# Patient Record
Sex: MALE | Race: WHITE | NOT HISPANIC OR LATINO
[De-identification: names, ages, dates, MRNs, and addresses within clinical notes are randomized per-mention and may not be internally consistent; named-entity substitution may affect disease eponyms.]

---

## 2017-12-12 ENCOUNTER — APPOINTMENT (OUTPATIENT)
Dept: UROLOGY | Facility: CLINIC | Age: 72
End: 2017-12-12
Payer: MEDICARE

## 2017-12-12 VITALS
TEMPERATURE: 98.1 F | SYSTOLIC BLOOD PRESSURE: 135 MMHG | HEIGHT: 74 IN | HEART RATE: 85 BPM | WEIGHT: 205 LBS | DIASTOLIC BLOOD PRESSURE: 75 MMHG | RESPIRATION RATE: 17 BRPM | BODY MASS INDEX: 26.31 KG/M2

## 2017-12-12 DIAGNOSIS — N48.1 BALANITIS: ICD-10-CM

## 2017-12-12 PROCEDURE — 99213 OFFICE O/P EST LOW 20 MIN: CPT

## 2018-10-02 ENCOUNTER — APPOINTMENT (OUTPATIENT)
Dept: UROLOGY | Facility: CLINIC | Age: 73
End: 2018-10-02
Payer: MEDICARE

## 2018-10-02 DIAGNOSIS — N41.1 CHRONIC PROSTATITIS: ICD-10-CM

## 2018-10-02 PROCEDURE — 99213 OFFICE O/P EST LOW 20 MIN: CPT

## 2018-12-11 ENCOUNTER — APPOINTMENT (OUTPATIENT)
Dept: UROLOGY | Facility: CLINIC | Age: 73
End: 2018-12-11
Payer: MEDICARE

## 2018-12-11 PROCEDURE — 99214 OFFICE O/P EST MOD 30 MIN: CPT

## 2019-01-19 ENCOUNTER — RX RENEWAL (OUTPATIENT)
Age: 74
End: 2019-01-19

## 2019-12-12 ENCOUNTER — FORM ENCOUNTER (OUTPATIENT)
Age: 74
End: 2019-12-12

## 2019-12-13 ENCOUNTER — OUTPATIENT (OUTPATIENT)
Dept: OUTPATIENT SERVICES | Facility: HOSPITAL | Age: 74
LOS: 1 days | End: 2019-12-13
Payer: MEDICARE

## 2019-12-13 ENCOUNTER — APPOINTMENT (OUTPATIENT)
Dept: UROLOGY | Facility: CLINIC | Age: 74
End: 2019-12-13
Payer: MEDICARE

## 2019-12-13 ENCOUNTER — APPOINTMENT (OUTPATIENT)
Dept: ULTRASOUND IMAGING | Facility: IMAGING CENTER | Age: 74
End: 2019-12-13
Payer: MEDICARE

## 2019-12-13 VITALS
WEIGHT: 195 LBS | TEMPERATURE: 98.4 F | BODY MASS INDEX: 25.03 KG/M2 | HEIGHT: 74 IN | HEART RATE: 78 BPM | DIASTOLIC BLOOD PRESSURE: 74 MMHG | RESPIRATION RATE: 17 BRPM | SYSTOLIC BLOOD PRESSURE: 134 MMHG

## 2019-12-13 DIAGNOSIS — N28.1 CYST OF KIDNEY, ACQUIRED: ICD-10-CM

## 2019-12-13 PROCEDURE — 76770 US EXAM ABDO BACK WALL COMP: CPT

## 2019-12-13 PROCEDURE — 76770 US EXAM ABDO BACK WALL COMP: CPT | Mod: 26

## 2019-12-13 PROCEDURE — 99213 OFFICE O/P EST LOW 20 MIN: CPT

## 2019-12-13 NOTE — ASSESSMENT
[FreeTextEntry1] : 75 yo male with history of prostatitis treated 1 year ago with doxycycline. Also improving LUTS. States he has had 30 lbs weight loss with improvement in A1c to 5.5. Lumbar MRI showing left renal cyst

## 2019-12-13 NOTE — HISTORY OF PRESENT ILLNESS
[FreeTextEntry1] : This is a 74 year old male with a hx of prostatitis treated with doxycycline last year. Had frequency and urgency. States symptoms are improved. States he has some pain with ejaculation. Denies hematuria.\par \par PSA checked by PMD last May 2019 --> 2.5. \par \par PVR - 18

## 2019-12-13 NOTE — PHYSICAL EXAM
[Normal Appearance] : normal appearance [Well Groomed] : well groomed [Abdomen Soft] : soft [Abdomen Mass (___ Cm)] : no abdominal mass palpated [Abdomen Tenderness] : non-tender [Prostate Tenderness] : the prostate was not tender [Prostate Enlargement] : the prostate was not enlarged [] : no rash [No Prostate Nodules] : no prostate nodules [Edema] : no peripheral edema [Exaggerated Use Of Accessory Muscles For Inspiration] : no accessory muscle use [Affect] : the affect was normal [Not Anxious] : not anxious [Normal Station and Gait] : the gait and station were normal for the patient's age [No Focal Deficits] : no focal deficits [No Palpable Adenopathy] : no palpable adenopathy

## 2020-12-16 ENCOUNTER — APPOINTMENT (OUTPATIENT)
Dept: UROLOGY | Facility: CLINIC | Age: 75
End: 2020-12-16
Payer: MEDICARE

## 2020-12-16 ENCOUNTER — OUTPATIENT (OUTPATIENT)
Dept: OUTPATIENT SERVICES | Facility: HOSPITAL | Age: 75
LOS: 1 days | End: 2020-12-16
Payer: MEDICARE

## 2020-12-16 VITALS
RESPIRATION RATE: 17 BRPM | SYSTOLIC BLOOD PRESSURE: 149 MMHG | HEART RATE: 79 BPM | TEMPERATURE: 98 F | HEIGHT: 74 IN | BODY MASS INDEX: 26.31 KG/M2 | WEIGHT: 205 LBS | DIASTOLIC BLOOD PRESSURE: 77 MMHG

## 2020-12-16 DIAGNOSIS — N28.1 CYST OF KIDNEY, ACQUIRED: ICD-10-CM

## 2020-12-16 DIAGNOSIS — N13.8 BENIGN PROSTATIC HYPERPLASIA WITH LOWER URINARY TRACT SYMPMS: ICD-10-CM

## 2020-12-16 DIAGNOSIS — N40.1 BENIGN PROSTATIC HYPERPLASIA WITH LOWER URINARY TRACT SYMPMS: ICD-10-CM

## 2020-12-16 DIAGNOSIS — N41.9 INFLAMMATORY DISEASE OF PROSTATE, UNSPECIFIED: ICD-10-CM

## 2020-12-16 DIAGNOSIS — R35.0 FREQUENCY OF MICTURITION: ICD-10-CM

## 2020-12-16 PROCEDURE — 76775 US EXAM ABDO BACK WALL LIM: CPT | Mod: 26

## 2020-12-16 PROCEDURE — 99072 ADDL SUPL MATRL&STAF TM PHE: CPT

## 2020-12-16 PROCEDURE — 51798 US URINE CAPACITY MEASURE: CPT

## 2020-12-16 PROCEDURE — 76775 US EXAM ABDO BACK WALL LIM: CPT

## 2020-12-16 PROCEDURE — 99213 OFFICE O/P EST LOW 20 MIN: CPT | Mod: 25

## 2020-12-16 RX ORDER — DOXYCYCLINE 100 MG/1
100 CAPSULE ORAL
Qty: 84 | Refills: 1 | Status: DISCONTINUED | COMMUNITY
Start: 2018-10-02 | End: 2020-12-16

## 2020-12-16 NOTE — ASSESSMENT
[FreeTextEntry1] : Renal cyst is stable . His complaints of urinary frequency and nocturia and the radiating pain to his testicle are his biggest issues .\par PVR is 97 . \par He drinks 4 cups of coffee and multiple glasses of water /day . \par We will add flomax for the frequency . \par He will see a neurosurgeon

## 2020-12-16 NOTE — HISTORY OF PRESENT ILLNESS
[FreeTextEntry1] : Small renal cyst noted on Sept 2016. We are following with ultrasound \par He also has urinary frequency \par He fell 4 months ago and had an MRI of his back and might need surgery . He has right sided pain radiating to his testicle . Pain is "burning " [Urinary Frequency] : urinary frequency [Nocturia] : nocturia

## 2020-12-18 DIAGNOSIS — N41.9 INFLAMMATORY DISEASE OF PROSTATE, UNSPECIFIED: ICD-10-CM

## 2020-12-18 DIAGNOSIS — N28.1 CYST OF KIDNEY, ACQUIRED: ICD-10-CM

## 2020-12-18 DIAGNOSIS — N40.1 BENIGN PROSTATIC HYPERPLASIA WITH LOWER URINARY TRACT SYMPTOMS: ICD-10-CM

## 2021-01-08 ENCOUNTER — APPOINTMENT (OUTPATIENT)
Dept: UROLOGY | Facility: CLINIC | Age: 76
End: 2021-01-08

## 2021-12-17 ENCOUNTER — APPOINTMENT (OUTPATIENT)
Dept: UROLOGY | Facility: CLINIC | Age: 76
End: 2021-12-17
Payer: MEDICARE

## 2021-12-17 ENCOUNTER — OUTPATIENT (OUTPATIENT)
Dept: OUTPATIENT SERVICES | Facility: HOSPITAL | Age: 76
LOS: 1 days | End: 2021-12-17
Payer: MEDICARE

## 2021-12-17 VITALS
HEIGHT: 74 IN | SYSTOLIC BLOOD PRESSURE: 150 MMHG | HEART RATE: 84 BPM | BODY MASS INDEX: 26.31 KG/M2 | TEMPERATURE: 98.4 F | DIASTOLIC BLOOD PRESSURE: 69 MMHG | WEIGHT: 205 LBS | RESPIRATION RATE: 17 BRPM

## 2021-12-17 DIAGNOSIS — R97.20 ELEVATED PROSTATE SPECIFIC ANTIGEN [PSA]: ICD-10-CM

## 2021-12-17 PROCEDURE — 99213 OFFICE O/P EST LOW 20 MIN: CPT

## 2021-12-17 PROCEDURE — 76775 US EXAM ABDO BACK WALL LIM: CPT | Mod: 26

## 2021-12-17 PROCEDURE — 76775 US EXAM ABDO BACK WALL LIM: CPT

## 2021-12-17 NOTE — HISTORY OF PRESENT ILLNESS
[FreeTextEntry1] : Last seen Dec 2020  Small Renal Cyst noted Sept 2016 -> Following with sonogram- Urinary frequency.  \par \par Last PVR 97 cc. Hx of Prostatitis in the past, got doxycycline course . \par \par Started Flomax last visit.- was only able to tolerate for 2 weeks- note with Norvasc, BP dropped too low, so was unable to continue. \par \par Pt continues with spinal injections- helping with back pain. Had right inguinal hernia repair on 7/2021.\par \par \par PSA Trend 2.5 (May 2019) <- 3.19 (5/2018) <- 2.04 (6/2017)- PSA 5.19 on 12/6/2021- PSA free.411 ng/mL (Done at [facility])\par \par Had PET scan done 1/2021- ordered by pulmonologist- shows incidental uptake thickened rectum- had sigmoidoscopy- had biopsies which were negative. Pt with chronic hx of back pain- but  had epidural recently and noting some increased back pain. \par \par DONNIE 20

## 2021-12-17 NOTE — ASSESSMENT
[FreeTextEntry1] : Renal US reviewed- no concerns. MRI of prostate ordered- pt note having MRI spine 2 days ago- not films or report in PACS.

## 2021-12-20 DIAGNOSIS — R35.0 FREQUENCY OF MICTURITION: ICD-10-CM

## 2021-12-21 ENCOUNTER — APPOINTMENT (OUTPATIENT)
Dept: MRI IMAGING | Facility: CLINIC | Age: 76
End: 2021-12-21
Payer: MEDICARE

## 2021-12-21 PROCEDURE — 72197 MRI PELVIS W/O & W/DYE: CPT

## 2021-12-21 PROCEDURE — A9585: CPT

## 2021-12-28 ENCOUNTER — NON-APPOINTMENT (OUTPATIENT)
Age: 76
End: 2021-12-28

## 2022-01-06 ENCOUNTER — OUTPATIENT (OUTPATIENT)
Dept: OUTPATIENT SERVICES | Facility: HOSPITAL | Age: 77
LOS: 1 days | End: 2022-01-06
Payer: MEDICARE

## 2022-01-06 ENCOUNTER — APPOINTMENT (OUTPATIENT)
Dept: UROLOGY | Facility: CLINIC | Age: 77
End: 2022-01-06
Payer: MEDICARE

## 2022-01-06 VITALS
HEART RATE: 84 BPM | RESPIRATION RATE: 18 BRPM | WEIGHT: 205 LBS | HEIGHT: 74 IN | SYSTOLIC BLOOD PRESSURE: 129 MMHG | DIASTOLIC BLOOD PRESSURE: 79 MMHG | BODY MASS INDEX: 26.31 KG/M2

## 2022-01-06 VITALS — HEART RATE: 79 BPM | SYSTOLIC BLOOD PRESSURE: 126 MMHG | DIASTOLIC BLOOD PRESSURE: 67 MMHG

## 2022-01-06 VITALS — SYSTOLIC BLOOD PRESSURE: 114 MMHG | RESPIRATION RATE: 17 BRPM | DIASTOLIC BLOOD PRESSURE: 73 MMHG | HEART RATE: 75 BPM

## 2022-01-06 DIAGNOSIS — R35.0 FREQUENCY OF MICTURITION: ICD-10-CM

## 2022-01-06 DIAGNOSIS — R93.5 ABNORMAL FINDINGS ON DIAGNOSTIC IMAGING OF OTHER ABDOMINAL REGIONS, INCLUDING RETROPERITONEUM: ICD-10-CM

## 2022-01-06 PROCEDURE — 55700: CPT | Mod: 22

## 2022-01-06 PROCEDURE — 76942 ECHO GUIDE FOR BIOPSY: CPT | Mod: 26,59

## 2022-01-06 PROCEDURE — 76872 US TRANSRECTAL: CPT

## 2022-01-06 PROCEDURE — 76377 3D RENDER W/INTRP POSTPROCES: CPT | Mod: 26

## 2022-01-06 PROCEDURE — 55700: CPT

## 2022-01-06 PROCEDURE — 76942 ECHO GUIDE FOR BIOPSY: CPT | Mod: 59

## 2022-01-07 ENCOUNTER — NON-APPOINTMENT (OUTPATIENT)
Age: 77
End: 2022-01-07

## 2022-01-08 PROBLEM — R93.5 ABNORMAL MRI, PELVIS: Status: ACTIVE | Noted: 2022-01-05

## 2022-01-10 ENCOUNTER — NON-APPOINTMENT (OUTPATIENT)
Age: 77
End: 2022-01-10

## 2022-01-10 LAB — CORE LAB BIOPSY: NORMAL

## 2022-01-12 DIAGNOSIS — R97.20 ELEVATED PROSTATE SPECIFIC ANTIGEN [PSA]: ICD-10-CM

## 2022-01-12 DIAGNOSIS — R93.5 ABNORMAL FINDINGS ON DIAGNOSTIC IMAGING OF OTHER ABDOMINAL REGIONS, INCLUDING RETROPERITONEUM: ICD-10-CM

## 2022-02-07 ENCOUNTER — OUTPATIENT (OUTPATIENT)
Dept: OUTPATIENT SERVICES | Facility: HOSPITAL | Age: 77
LOS: 1 days | Discharge: HOME | End: 2022-02-07
Payer: MEDICARE

## 2022-02-08 ENCOUNTER — APPOINTMENT (OUTPATIENT)
Dept: RADIATION ONCOLOGY | Facility: CLINIC | Age: 77
End: 2022-02-08
Payer: MEDICARE

## 2022-02-08 PROCEDURE — 99024 POSTOP FOLLOW-UP VISIT: CPT

## 2022-02-08 PROCEDURE — 77263 THER RADIOLOGY TX PLNG CPLX: CPT

## 2022-02-09 NOTE — DISEASE MANAGEMENT
[1] : T1 [c] : c [0] : M0 [0-10] : 0 -10 ng/mL [Biopsy with Fusion] : Patient had a biopsy with fusion on [7(3+4)] : Fusion Biopsy Chadwicks Score: 7(3+4) [] : Patient had a Prostate MRI [4] : 4 [IIB] : IIB [BiopsyDate] : 01/22 [MeasuredProstateVolume] : 30 [TotalCores] : 14 [TotalPositiveCores] : 5 [MaxCoreInvolvement] : 80
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
## 2022-02-09 NOTE — END OF VISIT
[] : Resident [FreeTextEntry3] : I saw and examined this patient on the date of service with my assigned resident physician, Dr. Yannick Rivas. I was involved in all procedures and radiographic assessments. I personally confirmed pertinent history and exam findings and reviewed the patient's diagnosis and plan with them.\par \par 76M w/ cT1c G7(3+4) iPSA 5.19 (12/6/21) prostate cancer; favorable intermediate risk. History of ulcerative colitis, well controlled. We reviewed his treatment options; I do think an active therapy is appropriate, and either surgery or brachytherapy would be reasonable. After discussing the risks and benefits of treatment, he wishes to pursue brachytherapy; I think it would be safe to get his repeat colonoscopy out of the way, and schedule him for treatment in May. He is in agreement with this plan and we will make arrangements.

## 2022-02-09 NOTE — DATA REVIEWED
[FreeTextEntry1] : MRI 12/21/21 showed a 4 x 3.3 x 4.3 cm prostate (30 cc) with dominant lesions in the right anterior mid TZ (1.1 cm, ME-3) and left anterior mid PZ (0.6 cm, ME-4); no clear EMERY/SVI/LAD. No anatomic issues.  \par \par Prostate biopsy 1/5/22 showed G7(3+4) disease in both target lesions (2/3 and 3/3, <5-80%) and in the left lateral and anterior gland (2/2, 25-80%); G6(3+3) disease was seen in the left posterior lateral base. 5/14 sites sampled positive.

## 2022-02-09 NOTE — HISTORY OF PRESENT ILLNESS
[Home] : at home, [unfilled] , at the time of the visit. [Medical Office: ([facility])___] : at the medical office located in  [Spouse] : spouse [Verbal consent obtained from patient] : the patient, [unfilled] [FreeTextEntry1] : 75 yo man with Kimberley 3+4 prostate cancer in 4/14 cores and Kimberley 6 disease in 1/14 cores. pre-treatment PSA 5.19\par \par He follows with Dr. Allen since 2016. He had a history of prostatitis in the past that was treated with doxycycline. Started flomax in 2021 for urinary frequency however could not tolerate it due to orthostatic hypotension. \par \par PET scan 1/2021 ordered by pulmonologist- showed minimal uptake in the RML nodule; low grade malignancy not excluded. Incidental uptake thickened rectum. per note, patient had sigmoidoscopy- had biopsies which were negative. \par \par Pt with chronic hx of back pain- but had epidural recently and noting some increased back pain. \par \par PSA trend:\par 12/6/21 5.19\par May 2019 2.5\par 5/2018 3.19\par \par 12/22/21 MRI prostate volume: 30cc\par LESION: #1 Location: Right, anterior (TZa), midgland, transition zone. no EPE. PIRADS 3\par LESION: #2 Location: Left, anterior (PZa), midgland, peripheral zone. Abuts but does not deform capsule. PIRADS 4\par No EMERY, no Neurovascular bundle involvement, no SVI, negative lymph nodes. no osseous lesions.\par \par 1/10/22 prostate biopsy: Gonzales 6 in 1/14 cores, Gonzales 3+4 in 4 cores. Total positive cores 5/14. up to 80% involvement\par \par Prostate, left posterior lateral base, biopsy -Adenocarcinoma of the prostate, Prognostic Grade Group 1 (Gonzales\par  score 3+3=6) involving 25% (1.5 mm in length) of 1 of 1 core(s).\par \par Prostate, left lateral, biopsy Adenocarcinoma of the prostate, Prognostic Grade Group 2 (Kimberley\par  score 3+4=7) involving 80% (4.5 mm in length) of 1 of 1 core(s). Kimberley pattern 4 comprises 5% of tumor.\par \par Prostate, left anterior, biopsy -Adenocarcinoma of the prostate, Prognostic Grade Group 2 (Gonzales\par  score 3+4=7) involving 60% (4.5 mm in length) of 1 of 1 core(s). Kimberley pattern 4 comprises 5% of tumor.\par \par Prostate, MRI target lesion 1 right mid TZA, biopsy -Adenocarcinoma of the prostate, Prognostic Grade Group 2 (Kimberley score 3+4=7) involving 50% and less than 5% (2.5 mm and less than 0.5 mm in length) of 2 of 3 core(s). Gonzales pattern 4 comprises 5% of tumor. Perineural invasion is identified.\par \par Prostate, MRI target lesion 2 left mid PZA, biopsy-Adenocarcinoma of the prostate, Prognostic Grade Group 2 (Kimberley score 3+4=7) involving 80%, 60% and 60% (4.5 mm, 4.5 mm and 3 mm in length) of 3 of 3 core(s). Kimberley pattern 4 comprises 10% of tumor.\par \par Today: Patient is overall doing well. Reports some urinary urgency and frequency. His ulcerative colitis is well controlled on medication; he has never been hospitalized for UC and his last flare was several years ago. He lives in [place] and would like to come to [place] for treatment. He will also get a colonoscopy prior to treatment.

## 2022-02-09 NOTE — PHYSICAL EXAM
[Normal] : oriented to person, place and time, the affect was normal, the mood was normal and not anxious [] : no respiratory distress [Respiration, Rhythm And Depth] : normal respiratory rhythm and effort [Exaggerated Use Of Accessory Muscles For Inspiration] : no accessory muscle use [de-identified] : limited due to telehealth [FreeTextEntry1] : Telehealth visit, audio/video

## 2022-05-06 ENCOUNTER — NON-APPOINTMENT (OUTPATIENT)
Age: 77
End: 2022-05-06

## 2022-05-06 PROCEDURE — 77263 THER RADIOLOGY TX PLNG CPLX: CPT

## 2022-05-09 ENCOUNTER — OUTPATIENT (OUTPATIENT)
Dept: OUTPATIENT SERVICES | Facility: HOSPITAL | Age: 77
LOS: 1 days | End: 2022-05-09
Payer: MEDICARE

## 2022-05-09 VITALS
WEIGHT: 199.96 LBS | TEMPERATURE: 98 F | SYSTOLIC BLOOD PRESSURE: 130 MMHG | DIASTOLIC BLOOD PRESSURE: 72 MMHG | HEART RATE: 78 BPM | OXYGEN SATURATION: 98 % | RESPIRATION RATE: 16 BRPM | HEIGHT: 72 IN

## 2022-05-09 DIAGNOSIS — C61 MALIGNANT NEOPLASM OF PROSTATE: ICD-10-CM

## 2022-05-09 DIAGNOSIS — M43.22 FUSION OF SPINE, CERVICAL REGION: Chronic | ICD-10-CM

## 2022-05-09 DIAGNOSIS — K51.90 ULCERATIVE COLITIS, UNSPECIFIED, WITHOUT COMPLICATIONS: ICD-10-CM

## 2022-05-09 DIAGNOSIS — Z98.890 OTHER SPECIFIED POSTPROCEDURAL STATES: Chronic | ICD-10-CM

## 2022-05-09 DIAGNOSIS — Z91.89 OTHER SPECIFIED PERSONAL RISK FACTORS, NOT ELSEWHERE CLASSIFIED: ICD-10-CM

## 2022-05-09 LAB
A1C WITH ESTIMATED AVERAGE GLUCOSE RESULT: 6.1 % — HIGH (ref 4–5.6)
ESTIMATED AVERAGE GLUCOSE: 128 — SIGNIFICANT CHANGE UP
GLUCOSE SERPL-MCNC: 149 MG/DL — HIGH (ref 70–99)

## 2022-05-09 PROCEDURE — 93010 ELECTROCARDIOGRAM REPORT: CPT

## 2022-05-09 NOTE — H&P PST ADULT - NSICDXPASTMEDICALHX_GEN_ALL_CORE_FT
PAST MEDICAL HISTORY:  BPH (benign prostatic hyperplasia)     Hyperlipidemia     Hypertension     Prostate CA     Seasonal allergies     Ulcerative colitis

## 2022-05-09 NOTE — H&P PST ADULT - PROBLEM SELECTOR PLAN 1
Patient tentatively scheduled for insertion of radioactive seeds into prostate    Pre-op instructions provided. Pt given verbal and written instructions with teach back on Pepcid. Pt verbalized understanding with return demonstration.    Pt has a scheduled preop COVID test. Patient tentatively scheduled for insertion of radioactive seeds into prostate on 5/11/22    Pre-op instructions provided. Pt given verbal and written instructions with teach back on Pepcid. Pt verbalized understanding with return demonstration.    Pt has a scheduled preop COVID test.

## 2022-05-09 NOTE — H&P PST ADULT - NEGATIVE GENERAL GENITOURINARY SYMPTOMS
no renal colic/no flank pain L/no flank pain R/no urine discoloration/no gas in urine/no bladder infections/no incontinence/no dysuria/no urinary hesitancy

## 2022-05-09 NOTE — H&P PST ADULT - SKIN COMMENTS
thin skin due to h/o chronic steroids ( after neck surgery) fragile skin due to h/o chronic steroids ( after neck surgery)

## 2022-05-09 NOTE — H&P PST ADULT - NSICDXPASTSURGICALHX_GEN_ALL_CORE_FT
PAST SURGICAL HISTORY:  Cervical vertebral fusion surgery 1996    H/O cardiac catheterization at [place] no stents as per patient    H/O hernia repair left and right inguinal in 2019  and umbilical repair    Status post correction of deviated nasal septum 1970s

## 2022-05-09 NOTE — H&P PST ADULT - ITE SK HX ROS MEA POS PC
thin skin due to past h/o prolonged use of prednisone/dryness fragile skin due to past h/o chronic use of prednisone/dryness

## 2022-05-09 NOTE — H&P PST ADULT - NS PANP COMMENT GEN_ALL_CORE FT
76M w/ cT1c G7(3+4) iPSA 5.19 (12/6/21) prostate cancer. History of ulcerative colitis. No changes to history, doing well.     He has decided to proceed with definitive treatment of his prostate with permanent seed implant. We will take him to OR today.     Kev Timmons MD PhD

## 2022-05-10 PROCEDURE — 77316 BRACHYTX ISODOSE PLAN SIMPLE: CPT | Mod: 26

## 2022-05-11 ENCOUNTER — TRANSCRIPTION ENCOUNTER (OUTPATIENT)
Age: 77
End: 2022-05-11

## 2022-05-11 ENCOUNTER — OUTPATIENT (OUTPATIENT)
Dept: OUTPATIENT SERVICES | Facility: HOSPITAL | Age: 77
LOS: 1 days | Discharge: HOME | End: 2022-05-11
Payer: MEDICARE

## 2022-05-11 VITALS
DIASTOLIC BLOOD PRESSURE: 65 MMHG | HEART RATE: 77 BPM | WEIGHT: 199.96 LBS | TEMPERATURE: 98 F | RESPIRATION RATE: 16 BRPM | SYSTOLIC BLOOD PRESSURE: 134 MMHG | HEIGHT: 72 IN | OXYGEN SATURATION: 98 %

## 2022-05-11 VITALS
RESPIRATION RATE: 15 BRPM | SYSTOLIC BLOOD PRESSURE: 126 MMHG | HEART RATE: 79 BPM | TEMPERATURE: 99 F | DIASTOLIC BLOOD PRESSURE: 80 MMHG | OXYGEN SATURATION: 97 %

## 2022-05-11 DIAGNOSIS — C61 MALIGNANT NEOPLASM OF PROSTATE: ICD-10-CM

## 2022-05-11 DIAGNOSIS — M43.22 FUSION OF SPINE, CERVICAL REGION: Chronic | ICD-10-CM

## 2022-05-11 DIAGNOSIS — Z98.890 OTHER SPECIFIED POSTPROCEDURAL STATES: Chronic | ICD-10-CM

## 2022-05-11 PROBLEM — I10 ESSENTIAL (PRIMARY) HYPERTENSION: Chronic | Status: ACTIVE | Noted: 2022-05-09

## 2022-05-11 PROBLEM — E78.5 HYPERLIPIDEMIA, UNSPECIFIED: Chronic | Status: ACTIVE | Noted: 2022-05-09

## 2022-05-11 PROBLEM — K51.90 ULCERATIVE COLITIS, UNSPECIFIED, WITHOUT COMPLICATIONS: Chronic | Status: ACTIVE | Noted: 2022-05-09

## 2022-05-11 PROBLEM — J30.2 OTHER SEASONAL ALLERGIC RHINITIS: Chronic | Status: ACTIVE | Noted: 2022-05-09

## 2022-05-11 PROBLEM — N40.0 BENIGN PROSTATIC HYPERPLASIA WITHOUT LOWER URINARY TRACT SYMPTOMS: Chronic | Status: ACTIVE | Noted: 2022-05-09

## 2022-05-11 LAB — GLUCOSE BLDC GLUCOMTR-MCNC: 102 MG/DL — HIGH (ref 70–99)

## 2022-05-11 PROCEDURE — 77778 APPLY INTERSTIT RADIAT COMPL: CPT | Mod: 26

## 2022-05-11 PROCEDURE — 55875 TRANSPERI NEEDLE PLACE PROS: CPT

## 2022-05-11 PROCEDURE — 77331 SPECIAL RADIATION DOSIMETRY: CPT | Mod: 26

## 2022-05-11 PROCEDURE — 55874 TPRNL PLMT BIODEGRDABL MATRL: CPT

## 2022-05-11 PROCEDURE — 76965 ECHO GUIDANCE RADIOTHERAPY: CPT | Mod: 26

## 2022-05-11 PROCEDURE — 77295 3-D RADIOTHERAPY PLAN: CPT | Mod: 26

## 2022-05-11 PROCEDURE — 77332 RADIATION TREATMENT AID(S): CPT | Mod: 26

## 2022-05-11 DEVICE — HYDROGEL SPACEOAR DISP 10ML: Type: IMPLANTABLE DEVICE | Status: FUNCTIONAL

## 2022-05-11 RX ORDER — TADALAFIL 10 MG/1
1 TABLET, FILM COATED ORAL
Qty: 0 | Refills: 0 | DISCHARGE

## 2022-05-11 RX ORDER — ROSUVASTATIN CALCIUM 5 MG/1
1 TABLET ORAL
Qty: 0 | Refills: 0 | DISCHARGE

## 2022-05-11 RX ORDER — MESALAMINE 400 MG
2 TABLET, DELAYED RELEASE (ENTERIC COATED) ORAL
Qty: 0 | Refills: 0 | DISCHARGE

## 2022-05-11 RX ORDER — TETRAHYDROZOLINE/POLYETHYL GLY 0.05 %-1 %
2 DROPS OPHTHALMIC (EYE)
Qty: 0 | Refills: 0 | DISCHARGE

## 2022-05-11 RX ORDER — AMLODIPINE BESYLATE 2.5 MG/1
1 TABLET ORAL
Qty: 0 | Refills: 0 | DISCHARGE

## 2022-05-11 RX ORDER — ACETAMINOPHEN 500 MG
2 TABLET ORAL
Qty: 0 | Refills: 0 | DISCHARGE

## 2022-05-11 RX ORDER — MONTELUKAST 4 MG/1
1 TABLET, CHEWABLE ORAL
Qty: 0 | Refills: 0 | DISCHARGE

## 2022-05-11 NOTE — ASU DISCHARGE PLAN (ADULT/PEDIATRIC) - NURSING INSTRUCTIONS
You received IV Tylenol for pain management at 9:15am. Please DO NOT take any Tylenol (Acetaminophen) containing products, such as Vicodin, Percocet, Excedrin, and cold medications for the next 6 hours (until 3:15pm). DO NOT TAKE MORE THAN 3000 MG OF TYLENOL in a 24 hour period.

## 2022-05-11 NOTE — ASU DISCHARGE PLAN (ADULT/PEDIATRIC) - CARE PROVIDER_API CALL
Kev Timmons)  Radiation Oncology  [facility] - Dept. of Radiation Medicine, [address] [place] [address]  Phone: [phone]  Fax: [phone]  Established Patient  Follow Up Time:

## 2022-05-11 NOTE — ASU DISCHARGE PLAN (ADULT/PEDIATRIC) - NS MD DC FALL RISK RISK
For information on Fall & Injury Prevention, visit: [email or web address] OR  [email or web address] OR  https://www.cdc.gov/steadi/patient.html

## 2022-05-11 NOTE — ASU DISCHARGE PLAN (ADULT/PEDIATRIC) - CALL YOUR DOCTOR IF YOU HAVE ANY OF THE FOLLOWING:
Wound/Surgical Site with redness, or foul smelling discharge or pus/Unable to urinate/Inability to tolerate liquids or foods

## 2022-05-24 ENCOUNTER — NON-APPOINTMENT (OUTPATIENT)
Age: 77
End: 2022-05-24

## 2022-05-24 RX ORDER — TAMSULOSIN HYDROCHLORIDE 0.4 MG/1
0.4 CAPSULE ORAL
Qty: 90 | Refills: 3 | Status: DISCONTINUED | COMMUNITY
Start: 2020-12-16 | End: 2022-05-24

## 2022-06-10 PROCEDURE — 77295 3-D RADIOTHERAPY PLAN: CPT | Mod: 26

## 2022-06-16 RX ORDER — METHYLPREDNISOLONE 4 MG/1
4 TABLET ORAL
Qty: 1 | Refills: 0 | Status: DISCONTINUED | COMMUNITY
Start: 2022-05-27 | End: 2022-06-16

## 2022-06-16 RX ORDER — SOLIFENACIN SUCCINATE 5 MG/1
5 TABLET ORAL
Qty: 30 | Refills: 2 | Status: DISCONTINUED | COMMUNITY
Start: 2022-05-24 | End: 2022-06-16

## 2022-06-16 RX ORDER — METHYLPREDNISOLONE 4 MG/1
4 TABLET ORAL
Qty: 1 | Refills: 0 | Status: COMPLETED | COMMUNITY
Start: 2022-06-07 | End: 2022-06-16

## 2022-06-18 ENCOUNTER — EMERGENCY (EMERGENCY)
Facility: HOSPITAL | Age: 77
LOS: 1 days | Discharge: HOME | End: 2022-06-18
Attending: EMERGENCY MEDICINE
Payer: MEDICARE

## 2022-06-18 VITALS
OXYGEN SATURATION: 99 % | DIASTOLIC BLOOD PRESSURE: 56 MMHG | HEART RATE: 72 BPM | TEMPERATURE: 98 F | SYSTOLIC BLOOD PRESSURE: 120 MMHG | RESPIRATION RATE: 19 BRPM

## 2022-06-18 VITALS
HEART RATE: 89 BPM | OXYGEN SATURATION: 98 % | WEIGHT: 192.9 LBS | HEIGHT: 72 IN | RESPIRATION RATE: 20 BRPM | SYSTOLIC BLOOD PRESSURE: 99 MMHG | DIASTOLIC BLOOD PRESSURE: 65 MMHG | TEMPERATURE: 98 F

## 2022-06-18 DIAGNOSIS — Z98.890 OTHER SPECIFIED POSTPROCEDURAL STATES: Chronic | ICD-10-CM

## 2022-06-18 DIAGNOSIS — M43.22 FUSION OF SPINE, CERVICAL REGION: Chronic | ICD-10-CM

## 2022-06-18 LAB
ALBUMIN SERPL ELPH-MCNC: 4 G/DL — SIGNIFICANT CHANGE UP (ref 3.3–5.2)
ALP SERPL-CCNC: 77 U/L — SIGNIFICANT CHANGE UP (ref 40–120)
ALT FLD-CCNC: 28 U/L — SIGNIFICANT CHANGE UP
ANION GAP SERPL CALC-SCNC: 11 MMOL/L — SIGNIFICANT CHANGE UP (ref 5–17)
APPEARANCE UR: CLEAR — SIGNIFICANT CHANGE UP
AST SERPL-CCNC: 19 U/L — SIGNIFICANT CHANGE UP
BACTERIA # UR AUTO: ABNORMAL
BASOPHILS # BLD AUTO: 0.03 K/UL — SIGNIFICANT CHANGE UP (ref 0–0.2)
BASOPHILS NFR BLD AUTO: 0.5 % — SIGNIFICANT CHANGE UP (ref 0–2)
BILIRUB SERPL-MCNC: 0.7 MG/DL — SIGNIFICANT CHANGE UP (ref 0.4–2)
BILIRUB UR-MCNC: ABNORMAL
BUN SERPL-MCNC: 18.4 MG/DL — SIGNIFICANT CHANGE UP (ref 8–20)
CALCIUM SERPL-MCNC: 9.4 MG/DL — SIGNIFICANT CHANGE UP (ref 8.6–10.2)
CHLORIDE SERPL-SCNC: 102 MMOL/L — SIGNIFICANT CHANGE UP (ref 98–107)
CO2 SERPL-SCNC: 26 MMOL/L — SIGNIFICANT CHANGE UP (ref 22–29)
COLOR SPEC: ABNORMAL
CREAT SERPL-MCNC: 1.21 MG/DL — SIGNIFICANT CHANGE UP (ref 0.5–1.3)
DIFF PNL FLD: ABNORMAL
EGFR: 62 ML/MIN/1.73M2 — SIGNIFICANT CHANGE UP
EOSINOPHIL # BLD AUTO: 0.08 K/UL — SIGNIFICANT CHANGE UP (ref 0–0.5)
EOSINOPHIL NFR BLD AUTO: 1.2 % — SIGNIFICANT CHANGE UP (ref 0–6)
EPI CELLS # UR: SIGNIFICANT CHANGE UP
GLUCOSE SERPL-MCNC: 139 MG/DL — HIGH (ref 70–99)
GLUCOSE UR QL: NEGATIVE — SIGNIFICANT CHANGE UP
HCT VFR BLD CALC: 44.4 % — SIGNIFICANT CHANGE UP (ref 39–50)
HGB BLD-MCNC: 14.4 G/DL — SIGNIFICANT CHANGE UP (ref 13–17)
HYALINE CASTS # UR AUTO: ABNORMAL /LPF
IMM GRANULOCYTES NFR BLD AUTO: 0.5 % — SIGNIFICANT CHANGE UP (ref 0–1.5)
KETONES UR-MCNC: ABNORMAL
LEUKOCYTE ESTERASE UR-ACNC: NEGATIVE — SIGNIFICANT CHANGE UP
LYMPHOCYTES # BLD AUTO: 1.2 K/UL — SIGNIFICANT CHANGE UP (ref 1–3.3)
LYMPHOCYTES # BLD AUTO: 18.3 % — SIGNIFICANT CHANGE UP (ref 13–44)
MCHC RBC-ENTMCNC: 29.7 PG — SIGNIFICANT CHANGE UP (ref 27–34)
MCHC RBC-ENTMCNC: 32.4 GM/DL — SIGNIFICANT CHANGE UP (ref 32–36)
MCV RBC AUTO: 91.5 FL — SIGNIFICANT CHANGE UP (ref 80–100)
MONOCYTES # BLD AUTO: 0.48 K/UL — SIGNIFICANT CHANGE UP (ref 0–0.9)
MONOCYTES NFR BLD AUTO: 7.3 % — SIGNIFICANT CHANGE UP (ref 2–14)
NEUTROPHILS # BLD AUTO: 4.74 K/UL — SIGNIFICANT CHANGE UP (ref 1.8–7.4)
NEUTROPHILS NFR BLD AUTO: 72.2 % — SIGNIFICANT CHANGE UP (ref 43–77)
NITRITE UR-MCNC: POSITIVE
PH UR: 5 — SIGNIFICANT CHANGE UP (ref 5–8)
PLATELET # BLD AUTO: 167 K/UL — SIGNIFICANT CHANGE UP (ref 150–400)
POTASSIUM SERPL-MCNC: 4.3 MMOL/L — SIGNIFICANT CHANGE UP (ref 3.5–5.3)
POTASSIUM SERPL-SCNC: 4.3 MMOL/L — SIGNIFICANT CHANGE UP (ref 3.5–5.3)
PROT SERPL-MCNC: 7 G/DL — SIGNIFICANT CHANGE UP (ref 6.6–8.7)
PROT UR-MCNC: SIGNIFICANT CHANGE UP MG/DL
RBC # BLD: 4.85 M/UL — SIGNIFICANT CHANGE UP (ref 4.2–5.8)
RBC # FLD: 12.6 % — SIGNIFICANT CHANGE UP (ref 10.3–14.5)
RBC CASTS # UR COMP ASSIST: SIGNIFICANT CHANGE UP /HPF (ref 0–4)
SODIUM SERPL-SCNC: 139 MMOL/L — SIGNIFICANT CHANGE UP (ref 135–145)
SP GR SPEC: 1.01 — SIGNIFICANT CHANGE UP (ref 1.01–1.02)
UROBILINOGEN FLD QL: 12
WBC # BLD: 6.56 K/UL — SIGNIFICANT CHANGE UP (ref 3.8–10.5)
WBC # FLD AUTO: 6.56 K/UL — SIGNIFICANT CHANGE UP (ref 3.8–10.5)
WBC UR QL: SIGNIFICANT CHANGE UP /HPF (ref 0–5)

## 2022-06-18 PROCEDURE — 81001 URINALYSIS AUTO W/SCOPE: CPT

## 2022-06-18 PROCEDURE — G1004: CPT

## 2022-06-18 PROCEDURE — 99285 EMERGENCY DEPT VISIT HI MDM: CPT

## 2022-06-18 PROCEDURE — 74176 CT ABD & PELVIS W/O CONTRAST: CPT | Mod: ME

## 2022-06-18 PROCEDURE — 71045 X-RAY EXAM CHEST 1 VIEW: CPT | Mod: 26

## 2022-06-18 PROCEDURE — 85025 COMPLETE CBC W/AUTO DIFF WBC: CPT

## 2022-06-18 PROCEDURE — 80053 COMPREHEN METABOLIC PANEL: CPT

## 2022-06-18 PROCEDURE — 87086 URINE CULTURE/COLONY COUNT: CPT

## 2022-06-18 PROCEDURE — 96374 THER/PROPH/DIAG INJ IV PUSH: CPT | Mod: XU

## 2022-06-18 PROCEDURE — 71045 X-RAY EXAM CHEST 1 VIEW: CPT

## 2022-06-18 PROCEDURE — 93010 ELECTROCARDIOGRAM REPORT: CPT

## 2022-06-18 PROCEDURE — 74176 CT ABD & PELVIS W/O CONTRAST: CPT | Mod: 26,ME

## 2022-06-18 PROCEDURE — 36415 COLL VENOUS BLD VENIPUNCTURE: CPT

## 2022-06-18 PROCEDURE — 99285 EMERGENCY DEPT VISIT HI MDM: CPT | Mod: 25

## 2022-06-18 PROCEDURE — 93005 ELECTROCARDIOGRAM TRACING: CPT

## 2022-06-18 RX ORDER — CEFPODOXIME PROXETIL 100 MG
1 TABLET ORAL
Qty: 14 | Refills: 0
Start: 2022-06-18 | End: 2022-06-24

## 2022-06-18 RX ORDER — IOHEXOL 300 MG/ML
30 INJECTION, SOLUTION INTRAVENOUS ONCE
Refills: 0 | Status: COMPLETED | OUTPATIENT
Start: 2022-06-18 | End: 2022-06-18

## 2022-06-18 RX ORDER — SODIUM CHLORIDE 9 MG/ML
1000 INJECTION INTRAMUSCULAR; INTRAVENOUS; SUBCUTANEOUS ONCE
Refills: 0 | Status: COMPLETED | OUTPATIENT
Start: 2022-06-18 | End: 2022-06-18

## 2022-06-18 RX ORDER — RADIOPAQUE PVC MARKERS/BARIUM 24MARKERS
900 CAPSULE ORAL ONCE
Refills: 0 | Status: COMPLETED | OUTPATIENT
Start: 2022-06-18 | End: 2022-06-18

## 2022-06-18 RX ORDER — CEFTRIAXONE 500 MG/1
1000 INJECTION, POWDER, FOR SOLUTION INTRAMUSCULAR; INTRAVENOUS ONCE
Refills: 0 | Status: COMPLETED | OUTPATIENT
Start: 2022-06-18 | End: 2022-06-18

## 2022-06-18 RX ADMIN — SODIUM CHLORIDE 1000 MILLILITER(S): 9 INJECTION INTRAMUSCULAR; INTRAVENOUS; SUBCUTANEOUS at 11:50

## 2022-06-18 RX ADMIN — Medication 450 MILLILITER(S): at 12:10

## 2022-06-18 RX ADMIN — CEFTRIAXONE 100 MILLIGRAM(S): 500 INJECTION, POWDER, FOR SOLUTION INTRAMUSCULAR; INTRAVENOUS at 13:45

## 2022-06-18 RX ADMIN — SODIUM CHLORIDE 1000 MILLILITER(S): 9 INJECTION INTRAMUSCULAR; INTRAVENOUS; SUBCUTANEOUS at 11:57

## 2022-06-18 NOTE — ED PROVIDER NOTE - CLINICAL SUMMARY MEDICAL DECISION MAKING FREE TEXT BOX
Pt is a 76 y.o. M hx of prostate cancer s/p recent radiation seed placement 05/11/22, ulcerative colitis, spinal fusion, presenting for generalized weakness, decreased po intake, suprapubic tenderness, and lightheadedness for one week.

## 2022-06-18 NOTE — ED PROVIDER NOTE - PROGRESS NOTE DETAILS
Paris Parra, Resident: Pt improved, no complaints at this time. Lightheadedness resolved, walking at baseline. Seeking discharge. Explained lymphadenopathy and expressed need for follow up with oncologist, will print and give results of imaging and labs. Pt demonstrates understanding of condition, results, return precautions, red flags, and need for follow up and agrees with plan.

## 2022-06-18 NOTE — ED PROVIDER NOTE - PATIENT PORTAL LINK FT
You can access the FollowMyHealth Patient Portal offered by [facility] by registering at the following website: [email or web address]. By joining [employer]’s FollowMyHealth portal, you will also be able to view your health information using other applications (apps) compatible with our system.

## 2022-06-18 NOTE — ED PROVIDER NOTE - PHYSICAL EXAMINATION
General: NAD  Head:  NC, AT, temporal wasting  Eyes: EOMI, PERRLA, no scleral icterus, sunken skin around eyes  Ears: no erythema/drainage  Nose: midline, no bleeding/drainage  Throat: MMM  Cardiac: RRR, no m/r/g, no lower extremity edema  Respiratory: CTABL, no wheezes/rales/rhonchi, equal chest wall expansions  Abdomen: soft, ND, moderate abdominal tenderness in suprapubic region, no rebound tenderness, no guarding, nonperitonitic  MSK/Vascular: full ROM, distal pulses intact, soft compartments, warm extremities  Neuro: AAOx3, strength 5/5, sensation to light touch intact, cranial nerves 2-12 intact  Skin: +skin turgor  Psych: calm, cooperative, normal affect

## 2022-06-18 NOTE — ED PROVIDER NOTE - ATTENDING CONTRIBUTION TO CARE
76 y.o. M hx of prostate cancer s/p recent radiation seed placement 05/11/22, ulcerative colitis, spinal fusion, presenting for generalized weakness, decreased po intake, suprapubic tenderness, and lightheadedness for one week.

## 2022-06-18 NOTE — ED ADULT NURSE NOTE - OBJECTIVE STATEMENT
reports weakness, mild sob, and lower generalized abdominal pains. reports recent prostate procedure for prostate ca where radiation seeds were placed. a and o x3. breathing even and unlabored. nsr on cm. will continue to monitor.

## 2022-06-18 NOTE — ED PROVIDER NOTE - NSICDXPASTSURGICALHX_GEN_ALL_CORE_FT
PAST SURGICAL HISTORY:  Cervical vertebral fusion surgery 1996    H/O cardiac catheterization at [place] no stents as per patient    H/O hernia repair left and right inguinal in 2019  and umbilical repair    Status post correction of deviated nasal septum 1970s     65.3

## 2022-06-18 NOTE — ED PROVIDER NOTE - CARE PROVIDER_API CALL
Kev Timmons)  Radiation Oncology  [facility] - Dept. of Radiation Medicine, [address] [place] [address]  Phone: [phone]  Fax: [phone]  Follow Up Time:

## 2022-06-18 NOTE — ED PROVIDER NOTE - NSFOLLOWUPINSTRUCTIONS_ED_ALL_ED_FT
Follow up with your oncologist, especially given enlarged lymph nodes noted on CT imaging.     -----------------------------------------  Urinary Tract Infection, Adult       A urinary tract infection (UTI) is an infection of any part of the urinary tract. The urinary tract includes:    The kidneys.  The ureters.  The bladder.  The urethra.    These organs make, store, and get rid of pee (urine) in the body.    What are the causes?  This is caused by germs (bacteria) in your genital area. These germs grow and cause swelling (inflammation) of your urinary tract.    What increases the risk?  You are more likely to develop this condition if:    You have a small, thin tube (catheter) to drain pee.  You cannot control when you pee or poop (incontinence).  You are female, and:    You use these methods to prevent pregnancy:    A medicine that kills sperm (spermicide).  A device that blocks sperm (diaphragm).  You have low levels of a female hormone (estrogen).  You are pregnant.  You have genes that add to your risk.  You are sexually active.  You take antibiotic medicines.   You have trouble peeing because of:    A prostate that is bigger than normal, if you are male.  A blockage in the part of your body that drains pee from the bladder (urethra).  A kidney stone.   A nerve condition that affects your bladder (neurogenic bladder).  Not getting enough to drink.   Not peeing often enough.  You have other conditions, such as:    Diabetes.   A weak disease-fighting system (immune system).  Sickle cell disease.   Gout.   Injury of the spine.    What are the signs or symptoms?  Symptoms of this condition include:    Needing to pee right away (urgently).  Peeing often.  Peeing small amounts often.  Pain or burning when peeing.  Blood in the pee.  Pee that smells bad or not like normal.  Trouble peeing.  Pee that is cloudy.  Fluid coming from the vagina, if you are female.  Pain in the belly or lower back.    Other symptoms include:    Throwing up (vomiting).  No urge to eat.  Feeling mixed up (confused).  Being tired and grouchy (irritable).  A fever.  Watery poop (diarrhea).    How is this treated?  This condition may be treated with:    Antibiotic medicine.   Other medicines.  Drinking enough water.    Follow these instructions at home:         Medicines    Take over-the-counter and prescription medicines only as told by your doctor.  If you were prescribed an antibiotic medicine, take it as told by your doctor. Do not stop taking it even if you start to feel better.        General instructions    Make sure you:    Pee until your bladder is empty.   Do not hold pee for a long time.  Empty your bladder after sex.  Wipe from front to back after pooping if you are a female. Use each tissue one time when you wipe.  Drink enough fluid to keep your pee pale yellow.  Keep all follow-up visits as told by your doctor. This is important.    Contact a doctor if:  You do not get better after 1–2 days.  Your symptoms go away and then come back.    Get help right away if:  You have very bad back pain.  You have very bad pain in your lower belly.  You have a fever.  You are sick to your stomach (nauseous).  You are throwing up.    Summary  A urinary tract infection (UTI) is an infection of any part of the urinary tract.  This condition is caused by germs in your genital area.  There are many risk factors for a UTI. These include having a small, thin tube to drain pee and not being able to control when you pee or poop.  Treatment includes antibiotic medicines for germs.  Drink enough fluid to keep your pee pale yellow.    ADDITIONAL NOTES AND INSTRUCTIONS    Please follow up with your Primary MD in 24-48 hr.  Seek immediate medical care for any new/worsening signs or symptoms.

## 2022-06-18 NOTE — ED PROVIDER NOTE - OBJECTIVE STATEMENT
Pt is a 76 y.o. M hx of prostate cancer s/p recent radiation seed placement 05/11/22, ulcerative colitis, spinal fusion, presenting for weakness for one week. The pt describes generalized weakness and lightheadedness for one week, worse today since being started on flomax yesterday. The pt states he has had decreased PO intake due to fear of having to urinate. Pt with baseline difficulty urinating secondary to prostate cancer. Denies any focal weakness, decreased sensation or visual changes. Completed a course of antibiotics one week ago for suspected urinary infection, despite culture being negative. Pt is a 76 y.o. M hx of prostate cancer s/p recent radiation seed placement 05/11/22, ulcerative colitis, spinal fusion, presenting for weakness for one week. The pt describes generalized weakness and lightheadedness for one week, worse today since being started on flomax yesterday. The pt states he has had decreased PO intake due to fear of having to urinate. Pt with baseline difficulty urinating secondary to prostate cancer. Denies any focal weakness, decreased sensation or visual changes. Completed a course of antibiotics one week ago for suspected urinary infection, despite culture being negative. Pt's oncologist is Dr. Wolf, reachable at [phone] as per pt.

## 2022-06-18 NOTE — ED PROVIDER NOTE - DOMESTIC TRAVEL HIGH RISK QUESTION
November 28, 2018      Damien Nolasco MD  [address]  [facility] [address]           [facility] - Neurology Epilepsy  [address]  [facility] [address]  Phone: [phone]  Fax: [phone]          Patient: Pernell Ly   MR Number: [id number]   YOB: 1980   Date of Visit: 11/28/2018       Dear Dr. Damien Nolasco:    Thank you for referring Pernell Ly to me for evaluation. Attached you will find relevant portions of my assessment and plan of care.    If you have questions, please do not hesitate to call me. I look forward to following Pernell Ly along with you.    Sincerely,    Teresa Zavala PA-C    Enclosure  CC:  No Recipients    If you would like to receive this communication electronically, please contact [email or web address] or [phone] to request more information on [facility] [facility] access.    For providers and/or their staff who would like to refer a patient to [facility], please contact us through our one-stop-shop provider referral line, [facility], at [phone].    If you feel you have received this communication in error or would no longer like to receive these types of communications, please e-mail [email or web address]         
No

## 2022-06-18 NOTE — ED PROCEDURE NOTE - PROCEDURE ADDITIONAL DETAILS
Educational Bedside U/S performed, confirmatory study to follow/completed. Explained to pt u/s study educational and not diagnostic and verbal consent provided.

## 2022-06-18 NOTE — ED ADULT NURSE NOTE - NSICDXPASTSURGICALHX_GEN_ALL_CORE_FT
PAST SURGICAL HISTORY:  Cervical vertebral fusion surgery 1996    H/O cardiac catheterization at [place] no stents as per patient    H/O hernia repair left and right inguinal in 2019  and umbilical repair    Status post correction of deviated nasal septum 1970s

## 2022-06-18 NOTE — ED ADULT TRIAGE NOTE - CHIEF COMPLAINT QUOTE
Pt arrives to ED c/o lower abd pain - has radiation seed placed in a month ago by Dr. Aguilera . Pt also endorses feeling dizzy

## 2022-06-19 LAB
CULTURE RESULTS: SIGNIFICANT CHANGE UP
SPECIMEN SOURCE: SIGNIFICANT CHANGE UP

## 2022-06-20 DIAGNOSIS — K64.9 UNSPECIFIED HEMORRHOIDS: ICD-10-CM

## 2022-07-01 ENCOUNTER — NON-APPOINTMENT (OUTPATIENT)
Age: 77
End: 2022-07-01

## 2022-07-02 ENCOUNTER — NON-APPOINTMENT (OUTPATIENT)
Age: 77
End: 2022-07-02

## 2022-07-07 ENCOUNTER — NON-APPOINTMENT (OUTPATIENT)
Age: 77
End: 2022-07-07

## 2022-07-12 ENCOUNTER — APPOINTMENT (OUTPATIENT)
Dept: RADIATION ONCOLOGY | Facility: CLINIC | Age: 77
End: 2022-07-12

## 2022-07-12 VITALS
TEMPERATURE: 98 F | RESPIRATION RATE: 16 BRPM | SYSTOLIC BLOOD PRESSURE: 125 MMHG | BODY MASS INDEX: 25.31 KG/M2 | OXYGEN SATURATION: 97 % | DIASTOLIC BLOOD PRESSURE: 69 MMHG | WEIGHT: 186.84 LBS | HEIGHT: 72 IN | HEART RATE: 90 BPM

## 2022-07-12 DIAGNOSIS — M62.89 OTHER SPECIFIED DISORDERS OF MUSCLE: ICD-10-CM

## 2022-07-12 DIAGNOSIS — R35.0 FREQUENCY OF MICTURITION: ICD-10-CM

## 2022-07-12 PROCEDURE — 99024 POSTOP FOLLOW-UP VISIT: CPT

## 2022-07-12 RX ORDER — TRAMADOL HYDROCHLORIDE 50 MG/1
50 TABLET, COATED ORAL 4 TIMES DAILY
Qty: 60 | Refills: 0 | Status: DISCONTINUED | COMMUNITY
Start: 2022-06-07 | End: 2022-07-12

## 2022-07-12 RX ORDER — AMLODIPINE BESYLATE 5 MG/1
5 TABLET ORAL
Qty: 90 | Refills: 0 | Status: ACTIVE | COMMUNITY
Start: 2022-03-03

## 2022-07-12 NOTE — DISEASE MANAGEMENT
[1] : T1 [c] : c [0] : M0 [0-10] : 0 -10 ng/mL [Biopsy with Fusion] : Patient had a biopsy with fusion on [7(3+4)] : Fusion Biopsy Dania Score: 7(3+4) [] : Patient had a Prostate MRI [4] : 4 [IIB] : IIB [BiopsyDate] : 01/22 [MeasuredProstateVolume] : 30 [TotalCores] : 14 [TotalPositiveCores] : 5 [MaxCoreInvolvement] : 80

## 2022-07-12 NOTE — END OF VISIT
[] : Resident [FreeTextEntry3] : I saw and examined this patient on the date of service with my assigned resident physician, Dr. Xiomy Jessica. I was involved in all procedures and assessments. 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 Treated with Pd-103 prostate implant on 5/11/22. No dosimetric concerns or seed migration on post-implant imaging. He has had significant inflammatory issues after treatment, possible ulcerative colitis flare superimposed on radiation inflammation; there also is likely a compnent of pelvic floor dysfunction. While his symptoms are slowly improving with active management, he still has deleterious bowel and urinary symptoms. His perianal pain has improved on current regimen and we have encouraged hydration and electrolytes to improve his constipation. Based on his bladder ultrasound, he is not obstructed and his symptoms are more likely inflammatory/irritative; we will try another high dose medrol treatment and may follow with detrol if he does not improve (or have stable improvement). We will refer to gastroenterology locally to establish care for his ulcerative colitis, and will consider referral to pelvic floor rehab as well once he has made more improvement. \par \par We will arrange followup and PSA testing in about 2 months.

## 2022-07-12 NOTE — REVIEW OF SYSTEMS
[Urinary Incontinence: Grade 0] : Urinary Incontinence: Grade 0  [Urinary Retention: Grade 0] : Urinary Retention: Grade 0 [Urinary Tract Pain: Grade 1 - Mild pain] : Urinary Tract Pain: Grade 1 - Mild pain [Urinary Urgency: Grade 2 - Limiting instrumental ADL; medical management indicated] : Urinary Urgency: Grade 2 - Limiting instrumental ADL; medical management indicated [Urinary Frequency: Grade 2 - Limiting instrumental ADL; medical management indicated] : Urinary Frequency: Grade 2 - Limiting instrumental ADL; medical management indicated [Patient Intake Form Reviewed] : Patient intake form was reviewed [Diarrhea] : diarrhea [Nocturia] : nocturia [IPSS Score (0-40): ___] : IPSS score: [unfilled] [EPIC-CP Score (0-60): ___] : EPIC-CP score: [unfilled] [Constipation: Grade 1 - Occasional or intermittent symptoms; occasional use of stool softeners, laxatives, dietary modification, or enema] : Constipation: Grade 1 - Occasional or intermittent symptoms; occasional use of stool softeners, laxatives, dietary modification, or enema [Diarrhea: Grade 1 - Increase of <4 stools per day over baseline; mild increase in ostomy output compared to baseline] : Diarrhea: Grade 1 - Increase of <4 stools per day over baseline; mild increase in ostomy output compared to baseline [Fecal Incontinence: Grade 1 - Occasional use of pads required] : Fecal Incontinence: Grade 1 - Occasional use of pads required [Proctitis: Grade 2 - Symptoms (e.g., rectal discomfort, passing blood or mucus); medical intervention indicated; limiting instrumental ADL] : Proctitis: Grade 2 - Symptoms (e.g., rectal discomfort, passing blood or mucus); medical intervention indicated; limiting instrumental ADL [Constipation] : no constipation [FreeTextEntry2] : cough

## 2022-07-12 NOTE — HISTORY OF PRESENT ILLNESS
[FreeTextEntry1] : Mr. 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  He underwent a radioactive seed implant of the prostate on 5/11/22. He has a diagnosis of ulcerative colitis, previously requiring hospitalization prior to treatment. \par  \par RADIATION COURSE: Prostate Pd-103 seed implant on 5/11/22; 67 seeds, prescribed dose of 125 Gy.\par  \par CLINICAL  COURSE: He has had a difficult post treatment course with increased frequency and urgency, significant dysuria, and pelvic pain (suprapubic and sacral). He has also had worsening of bowel symptoms, with constipation/diarrhea and hemorrhoidal flare. \par \par He was treated with supportive and prescription Meds including Medrol dose packs, tramadol, AZO, Flomax for dysuria, perineal pain & urinary frequency. His rectal pain was managed with suppositories and pramoxine/steroid foam. He was also treated with a 2 course of Abx for UTI including 1 course in [place], although no clear pathogen isolated on culture. He is taking flomax at night. \par \par Today: He has a post void residual of 4-11cc on bladder scanner. He continues on Flomax and experiences nocturia 4-5 times nightly with urgency/frequency throughout the day. AUA/EPIC 26/38. His perianal/hemorrhoidal pain has improved significantly on his current regimen, but he still has constipation and sudden rectal urgency aggravated by change in position. He is limiting fluid/food intake, and his symptoms have significantly impacted his activities.

## 2022-07-12 NOTE — PHYSICAL EXAM
[General Appearance - Well Developed] : well developed [General Appearance - In No Acute Distress] : in no acute distress [Thin] : thin [] : no respiratory distress [Respiration, Rhythm And Depth] : normal respiratory rhythm and effort [Exaggerated Use Of Accessory Muscles For Inspiration] : no accessory muscle use [Abdomen Soft] : soft [Nondistended] : nondistended [No Focal Deficits] : no focal deficits [Oriented To Time, Place, And Person] : oriented to person, place, and time [Affect] : the affect was normal [de-identified] : Bladder U/S, PVR 4-11 cc [de-identified] : Algetic gait

## 2022-07-12 NOTE — VITALS
[Least Pain Intensity: 0/10] : 0/10 [80: Normal activity with effort; some signs or symptoms of disease.] : 80: Normal activity with effort; some signs or symptoms of disease.  [Maximal Pain Intensity: 2/10] : 2/10 [ECOG Performance Status: 1 - Restricted in physically strenuous activity but ambulatory and able to carry out work of a light or sedentary nature] : Performance Status: 1 - Restricted in physically strenuous activity but ambulatory and able to carry out work of a light or sedentary nature, e.g., light house work, office work

## 2022-07-30 ENCOUNTER — LABORATORY RESULT (OUTPATIENT)
Age: 77
End: 2022-07-30

## 2022-08-01 LAB
APPEARANCE: CLEAR
BILIRUBIN URINE: NEGATIVE
BLOOD URINE: NEGATIVE
COLOR: YELLOW
GLUCOSE QUALITATIVE U: ABNORMAL
KETONES URINE: NEGATIVE
LEUKOCYTE ESTERASE URINE: NEGATIVE
NITRITE URINE: NEGATIVE
PH URINE: 6
PROTEIN URINE: ABNORMAL
SPECIFIC GRAVITY URINE: >=1.03
UROBILINOGEN URINE: NORMAL

## 2022-08-03 ENCOUNTER — NON-APPOINTMENT (OUTPATIENT)
Age: 77
End: 2022-08-03

## 2022-08-03 DIAGNOSIS — K51.90 ULCERATIVE COLITIS, UNSPECIFIED, W/OUT COMPLICATIONS: ICD-10-CM

## 2022-08-04 ENCOUNTER — NON-APPOINTMENT (OUTPATIENT)
Age: 77
End: 2022-08-04

## 2022-08-29 ENCOUNTER — APPOINTMENT (RX ONLY)
Dept: URBAN - METROPOLITAN AREA CLINIC 116 | Facility: CLINIC | Age: 77
Setting detail: DERMATOLOGY
End: 2022-08-29

## 2022-08-29 DIAGNOSIS — D49.2 NEOPLASM OF UNSPECIFIED BEHAVIOR OF BONE, SOFT TISSUE, AND SKIN: ICD-10-CM

## 2022-08-29 DIAGNOSIS — Z85.828 PERSONAL HISTORY OF OTHER MALIGNANT NEOPLASM OF SKIN: ICD-10-CM

## 2022-08-29 DIAGNOSIS — L81.4 OTHER MELANIN HYPERPIGMENTATION: ICD-10-CM

## 2022-08-29 DIAGNOSIS — L85.3 XEROSIS CUTIS: ICD-10-CM | Status: INADEQUATELY CONTROLLED

## 2022-08-29 DIAGNOSIS — D18.0 HEMANGIOMA: ICD-10-CM

## 2022-08-29 DIAGNOSIS — D22 MELANOCYTIC NEVI: ICD-10-CM

## 2022-08-29 DIAGNOSIS — L57.0 ACTINIC KERATOSIS: ICD-10-CM

## 2022-08-29 DIAGNOSIS — L82.1 OTHER SEBORRHEIC KERATOSIS: ICD-10-CM

## 2022-08-29 PROBLEM — D22.5 MELANOCYTIC NEVI OF TRUNK: Status: ACTIVE | Noted: 2022-08-29

## 2022-08-29 PROBLEM — D18.01 HEMANGIOMA OF SKIN AND SUBCUTANEOUS TISSUE: Status: ACTIVE | Noted: 2022-08-29

## 2022-08-29 PROCEDURE — ? BIOPSY BY SHAVE METHOD

## 2022-08-29 PROCEDURE — ? COUNSELING

## 2022-08-29 PROCEDURE — ? DIAGNOSIS COMMENT

## 2022-08-29 PROCEDURE — 99203 OFFICE O/P NEW LOW 30 MIN: CPT | Mod: 25

## 2022-08-29 PROCEDURE — ? LIQUID NITROGEN

## 2022-08-29 PROCEDURE — 11102 TANGNTL BX SKIN SINGLE LES: CPT | Mod: 59

## 2022-08-29 PROCEDURE — ? ORDER FOR PHOTODYNAMIC THERAPY

## 2022-08-29 PROCEDURE — ? SUNSCREEN RECOMMENDATIONS

## 2022-08-29 PROCEDURE — 17004 DESTROY PREMAL LESIONS 15/>: CPT

## 2022-08-29 PROCEDURE — 11103 TANGNTL BX SKIN EA SEP/ADDL: CPT

## 2022-08-29 ASSESSMENT — LOCATION SIMPLE DESCRIPTION DERM
LOCATION SIMPLE: RIGHT CALF
LOCATION SIMPLE: LEFT CHEEK
LOCATION SIMPLE: RIGHT SCALP
LOCATION SIMPLE: CHIN
LOCATION SIMPLE: RIGHT KNEE
LOCATION SIMPLE: LEFT PRETIBIAL REGION
LOCATION SIMPLE: LEFT POPLITEAL SKIN
LOCATION SIMPLE: RIGHT EAR
LOCATION SIMPLE: RIGHT ELBOW
LOCATION SIMPLE: LEFT SCALP
LOCATION SIMPLE: LEFT FOREARM
LOCATION SIMPLE: RIGHT FOREHEAD
LOCATION SIMPLE: LOWER BACK
LOCATION SIMPLE: RIGHT LOWER BACK
LOCATION SIMPLE: NOSE
LOCATION SIMPLE: RIGHT TEMPLE
LOCATION SIMPLE: RIGHT CHEEK
LOCATION SIMPLE: RIGHT UPPER BACK
LOCATION SIMPLE: RIGHT LIP
LOCATION SIMPLE: RIGHT FOREARM
LOCATION SIMPLE: LEFT UPPER BACK
LOCATION SIMPLE: LEFT FOREHEAD
LOCATION SIMPLE: CHEST
LOCATION SIMPLE: LEFT EAR
LOCATION SIMPLE: ABDOMEN
LOCATION SIMPLE: LEFT TEMPLE
LOCATION SIMPLE: UPPER BACK

## 2022-08-29 ASSESSMENT — LOCATION DETAILED DESCRIPTION DERM
LOCATION DETAILED: UPPER STERNUM
LOCATION DETAILED: LEFT LATERAL FOREHEAD
LOCATION DETAILED: RIGHT ELBOW
LOCATION DETAILED: LEFT INFERIOR MEDIAL UPPER BACK
LOCATION DETAILED: RIGHT SUPERIOR LATERAL FOREHEAD
LOCATION DETAILED: SUBXIPHOID
LOCATION DETAILED: RIGHT CENTRAL FRONTAL SCALP
LOCATION DETAILED: LEFT POPLITEAL SKIN
LOCATION DETAILED: RIGHT INFERIOR LATERAL MALAR CHEEK
LOCATION DETAILED: RIGHT SUPERIOR TEMPLE
LOCATION DETAILED: LEFT PROXIMAL DORSAL FOREARM
LOCATION DETAILED: RIGHT LOWER CUTANEOUS LIP
LOCATION DETAILED: INFERIOR LUMBAR SPINE
LOCATION DETAILED: LEFT INFERIOR CENTRAL MALAR CHEEK
LOCATION DETAILED: LEFT DISTAL DORSAL FOREARM
LOCATION DETAILED: LEFT PROXIMAL PRETIBIAL REGION
LOCATION DETAILED: LEFT MEDIAL UPPER BACK
LOCATION DETAILED: RIGHT SUPERIOR LATERAL MIDBACK
LOCATION DETAILED: RIGHT LATERAL FOREHEAD
LOCATION DETAILED: LEFT MID TEMPLE
LOCATION DETAILED: RIGHT SUPERIOR LATERAL UPPER BACK
LOCATION DETAILED: RIGHT PROXIMAL CALF
LOCATION DETAILED: NASAL DORSUM
LOCATION DETAILED: RIGHT MEDIAL UPPER BACK
LOCATION DETAILED: RIGHT PROXIMAL DORSAL FOREARM
LOCATION DETAILED: RIGHT MEDIAL FOREHEAD
LOCATION DETAILED: LEFT SUPERIOR LATERAL FOREHEAD
LOCATION DETAILED: RIGHT KNEE
LOCATION DETAILED: LEFT SCAPHA
LOCATION DETAILED: RIGHT SUPERIOR UPPER BACK
LOCATION DETAILED: LEFT LATERAL UPPER BACK
LOCATION DETAILED: LEFT SUPERIOR UPPER BACK
LOCATION DETAILED: INFERIOR THORACIC SPINE
LOCATION DETAILED: RIGHT CHIN
LOCATION DETAILED: RIGHT DISTAL DORSAL FOREARM
LOCATION DETAILED: RIGHT SUPERIOR MEDIAL FOREHEAD
LOCATION DETAILED: LEFT SUPERIOR FOREHEAD
LOCATION DETAILED: LEFT CENTRAL FRONTAL SCALP
LOCATION DETAILED: LEFT INFERIOR UPPER BACK
LOCATION DETAILED: SUPERIOR LUMBAR SPINE
LOCATION DETAILED: LOWER STERNUM
LOCATION DETAILED: RIGHT SUPERIOR HELIX
LOCATION DETAILED: EPIGASTRIC SKIN

## 2022-08-29 ASSESSMENT — LOCATION ZONE DERM
LOCATION ZONE: SCALP
LOCATION ZONE: EAR
LOCATION ZONE: NOSE
LOCATION ZONE: ARM
LOCATION ZONE: LIP
LOCATION ZONE: LEG
LOCATION ZONE: FACE
LOCATION ZONE: TRUNK

## 2022-08-29 NOTE — PROCEDURE: ORDER FOR PHOTODYNAMIC THERAPY
Face, Ears And  Scalp Incubation Time: 1 Hour
Face Incubation Time: 2 hours
Consent: The procedure and risks were reviewed with the patient including but not limited to: burning, pigmentary changes, pain, blistering, scabbing, redness, and the possibility of needing numerous treatments. Strict photoprotection after the procedure was also discussed.
Detail Level: Simple
Photosensitizer: Levulan
Location: Face and Ears
Pdt Type: HARRIETT-U
Occlusion: No
Frequency Of Pdt: Single Treatment

## 2022-08-29 NOTE — PROCEDURE: LIQUID NITROGEN
Duration Of Freeze Thaw-Cycle (Seconds): 3
Consent: The patient's consent was obtained including but not limited to risks of crusting, scabbing, blistering, scarring, darker or lighter pigmentary change, recurrence, incomplete removal and infection.
Render Post-Care Instructions In Note?: yes
Aperture Size (Optional): B
Render Note In Bullet Format When Appropriate: No
Number Of Freeze-Thaw Cycles: 1 freeze-thaw cycle
Post-Care Instructions: I reviewed with the patient in detail post-care instructions. Patient is to wear sunprotection, and avoid picking at any of the treated lesions. Pt may apply Vaseline to crusted or scabbing areas.
Detail Level: Zone
Application Tool (Optional): Cry-AC

## 2022-08-29 NOTE — PROCEDURE: BIOPSY BY SHAVE METHOD
Detail Level: Detailed
Depth Of Biopsy: dermis
Was A Bandage Applied: Yes
Size Of Lesion In Cm: 1
X Size Of Lesion In Cm: 0
Biopsy Type: H and E
Biopsy Method: Personna blade
Anesthesia Type: 1% lidocaine with epinephrine
Anesthesia Volume In Cc (Will Not Render If 0): 1.5
Hemostasis: Aluminum Chloride
Wound Care: Vaseline
Dressing: pressure dressing with telfa
Destruction After The Procedure: No
Type Of Destruction Used: Curettage
Curettage Text: The wound bed was treated with curettage after the biopsy was performed.
Cryotherapy Text: The wound bed was treated with cryotherapy after the biopsy was performed.
Electrodesiccation Text: The wound bed was treated with electrodesiccation after the biopsy was performed.
Electrodesiccation And Curettage Text: The wound bed was treated with electrodesiccation and curettage after the biopsy was performed.
Silver Nitrate Text: The wound bed was treated with silver nitrate after the biopsy was performed.
[facility] [id number] [facility]
Consent: Written consent was obtained and risks were reviewed including but not limited to scarring, infection, bleeding, scabbing, incomplete removal, nerve damage and allergy to anesthesia.
Post-Care Instructions: I reviewed with the patient in detail post-care instructions. Patient is to keep the biopsy site dry overnight, and then apply bacitracin twice daily until healed. Patient may apply hydrogen peroxide soaks to remove any crusting.
Notification Instructions: Patient will be notified of biopsy results. However, patient instructed to call the office if not contacted within 2 weeks.
Billing Type: [employer]
Information: Selecting Yes will display possible errors in your note based on the variables you have selected. This validation is only offered as a suggestion for you. PLEASE NOTE THAT THE VALIDATION TEXT WILL BE REMOVED WHEN YOU FINALIZE YOUR NOTE. IF YOU WANT TO FAX A PRELIMINARY NOTE YOU WILL NEED TO TOGGLE THIS TO 'NO' IF YOU DO NOT WANT IT IN YOUR FAXED NOTE.
Lab: [id number]
Billing Type: Third-Party Bill
Size Of Lesion In Cm: 0.6
Size Of Lesion In Cm: 0.5

## 2022-09-01 ENCOUNTER — RX ONLY (OUTPATIENT)
Age: 77
Setting detail: RX ONLY
End: 2022-09-01

## 2022-09-01 RX ORDER — MUPIROCIN 20 MG/G
OINTMENT TOPICAL
Qty: 22 | Refills: 0 | Status: ERX | COMMUNITY
Start: 2022-09-01

## 2022-09-15 ENCOUNTER — APPOINTMENT (RX ONLY)
Dept: URBAN - METROPOLITAN AREA CLINIC 114 | Facility: CLINIC | Age: 77
Setting detail: DERMATOLOGY
End: 2022-09-15

## 2022-09-15 DIAGNOSIS — L57.0 ACTINIC KERATOSIS: ICD-10-CM

## 2022-09-15 PROBLEM — D04.39 CARCINOMA IN SITU OF SKIN OF OTHER PARTS OF FACE: Status: ACTIVE | Noted: 2022-09-15

## 2022-09-15 PROBLEM — C44.529 SQUAMOUS CELL CARCINOMA OF SKIN OF OTHER PART OF TRUNK: Status: ACTIVE | Noted: 2022-09-15

## 2022-09-15 PROBLEM — D04.62 CARCINOMA IN SITU OF SKIN OF LEFT UPPER LIMB, INCLUDING SHOULDER: Status: ACTIVE | Noted: 2022-09-15

## 2022-09-15 PROCEDURE — ? PATHOLOGY DISCUSSION

## 2022-09-15 PROCEDURE — ? COUNSELING

## 2022-09-15 PROCEDURE — ? PRESCRIPTION MEDICATION MANAGEMENT

## 2022-09-15 PROCEDURE — 99214 OFFICE O/P EST MOD 30 MIN: CPT

## 2022-09-15 PROCEDURE — ? CONSULTATION FOR MOHS SURGERY

## 2022-09-15 ASSESSMENT — LOCATION DETAILED DESCRIPTION DERM: LOCATION DETAILED: LEFT MEDIAL MALAR CHEEK

## 2022-09-15 ASSESSMENT — LOCATION ZONE DERM: LOCATION ZONE: FACE

## 2022-09-15 ASSESSMENT — LOCATION SIMPLE DESCRIPTION DERM: LOCATION SIMPLE: LEFT CHEEK

## 2022-09-15 NOTE — PROCEDURE: PRESCRIPTION MEDICATION MANAGEMENT
Render In Strict Bullet Format?: No
Plan: discussed different options, pdt vs efudex
Detail Level: Zone

## 2022-09-21 ENCOUNTER — APPOINTMENT (RX ONLY)
Dept: URBAN - METROPOLITAN AREA CLINIC 116 | Facility: CLINIC | Age: 77
Setting detail: DERMATOLOGY
End: 2022-09-21

## 2022-09-21 DIAGNOSIS — Z01.818 ENCOUNTER FOR OTHER PREPROCEDURAL EXAMINATION: ICD-10-CM

## 2022-09-21 PROCEDURE — ? COUNSELING

## 2022-10-25 ENCOUNTER — APPOINTMENT (OUTPATIENT)
Dept: RADIATION ONCOLOGY | Facility: CLINIC | Age: 77
End: 2022-10-25

## 2022-10-25 DIAGNOSIS — K62.89 OTHER SPECIFIED DISEASES OF ANUS AND RECTUM: ICD-10-CM

## 2022-10-25 PROCEDURE — 99213 OFFICE O/P EST LOW 20 MIN: CPT

## 2022-10-25 RX ORDER — METHYLPREDNISOLONE 4 MG/1
4 TABLET ORAL
Qty: 2 | Refills: 0 | Status: COMPLETED | COMMUNITY
Start: 2022-07-12 | End: 2022-08-25

## 2022-10-25 RX ORDER — GABAPENTIN 300 MG/1
300 CAPSULE ORAL 3 TIMES DAILY
Qty: 90 | Refills: 2 | Status: DISCONTINUED | COMMUNITY
Start: 2022-07-01 | End: 2022-10-25

## 2022-10-25 RX ORDER — HYDROCORTISONE ACETATE 25 MG/1
25 SUPPOSITORY RECTAL
Qty: 30 | Refills: 0 | Status: DISCONTINUED | COMMUNITY
Start: 2022-06-20 | End: 2022-10-25

## 2022-10-25 RX ORDER — HYDROCORTISONE ACETATE AND PRAMOXINE HYDROCHLORIDE 23.5; 1 MG/G; MG/G
1-2.35 CREAM TOPICAL TWICE DAILY
Qty: 2 | Refills: 2 | Status: DISCONTINUED | COMMUNITY
Start: 2022-07-01 | End: 2022-10-25

## 2022-10-25 RX ORDER — TOLTERODINE TARTRATE 4 MG/1
4 CAPSULE, EXTENDED RELEASE ORAL DAILY
Qty: 30 | Refills: 3 | Status: DISCONTINUED | COMMUNITY
Start: 2022-07-12 | End: 2022-10-25

## 2022-10-25 RX ORDER — DIAZEPAM 10 MG/1
10 TABLET ORAL
Qty: 90 | Refills: 0 | Status: COMPLETED | COMMUNITY
Start: 2022-05-09

## 2022-10-25 RX ORDER — CEFDINIR 300 MG/1
300 CAPSULE ORAL
Qty: 14 | Refills: 0 | Status: COMPLETED | COMMUNITY
Start: 2022-06-03

## 2022-10-25 RX ORDER — CEFPODOXIME PROXETIL 200 MG/1
200 TABLET, FILM COATED ORAL
Qty: 14 | Refills: 0 | Status: COMPLETED | COMMUNITY
Start: 2022-06-18

## 2022-10-25 RX ORDER — MUPIROCIN 20 MG/G
2 OINTMENT TOPICAL
Qty: 22 | Refills: 0 | Status: COMPLETED | COMMUNITY
Start: 2022-09-01

## 2022-10-25 RX ORDER — HYDROCORTISONE ACETATE, PRAMOXINE HCL 2.5; 1 G/100G; G/100G
2.5-1 CREAM TOPICAL
Qty: 30 | Refills: 0 | Status: COMPLETED | COMMUNITY
Start: 2022-07-25

## 2022-10-25 RX ORDER — TADALAFIL 10 MG/1
10 TABLET, FILM COATED ORAL
Qty: 36 | Refills: 0 | Status: ACTIVE | COMMUNITY
Start: 2022-05-09

## 2022-10-25 NOTE — REVIEW OF SYSTEMS
[Patient Intake Form Reviewed] : Patient intake form was reviewed [Diarrhea] : diarrhea [Nocturia] : nocturia [IPSS Score (0-40): ___] : IPSS score: [unfilled] [EPIC-CP Score (0-60): ___] : EPIC-CP score: [unfilled] [Constipation: Grade 1 - Occasional or intermittent symptoms; occasional use of stool softeners, laxatives, dietary modification, or enema] : Constipation: Grade 1 - Occasional or intermittent symptoms; occasional use of stool softeners, laxatives, dietary modification, or enema [Diarrhea: Grade 1 - Increase of <4 stools per day over baseline; mild increase in ostomy output compared to baseline] : Diarrhea: Grade 1 - Increase of <4 stools per day over baseline; mild increase in ostomy output compared to baseline [Fecal Incontinence: Grade 1 - Occasional use of pads required] : Fecal Incontinence: Grade 1 - Occasional use of pads required [Proctitis: Grade 2 - Symptoms (e.g., rectal discomfort, passing blood or mucus); medical intervention indicated; limiting instrumental ADL] : Proctitis: Grade 2 - Symptoms (e.g., rectal discomfort, passing blood or mucus); medical intervention indicated; limiting instrumental ADL [Urinary Incontinence: Grade 0] : Urinary Incontinence: Grade 0  [Urinary Retention: Grade 0] : Urinary Retention: Grade 0 [Urinary Tract Pain: Grade 1 - Mild pain] : Urinary Tract Pain: Grade 1 - Mild pain [Rectal Pain: Grade 1 - Mild pain] : Rectal Pain: Grade 1 - Mild pain [Urinary Urgency: Grade 1 - Present] : Urinary Urgency: Grade 1 - Present [Urinary Frequency: Grade 1 - Present] : Urinary Frequency: Grade 1 - Present [Constipation] : no constipation [FreeTextEntry2] : cough

## 2022-10-25 NOTE — REASON FOR VISIT
[Routine Follow-Up] : a routine follow-up visit for prostate cancer [Home] : at home, [unfilled] , at the time of the visit. [Medical Office: ([facility])___] : at the medical office located in  [Verbal consent obtained from patient] : the patient, [unfilled]

## 2022-10-25 NOTE — PHYSICAL EXAM
[General Appearance - In No Acute Distress] : in no acute distress [] : no respiratory distress [Exaggerated Use Of Accessory Muscles For Inspiration] : no accessory muscle use [Oriented To Time, Place, And Person] : oriented to person, place, and time [Affect] : the affect was normal [FreeTextEntry1] : televisit only, limited exam

## 2022-10-25 NOTE — DISEASE MANAGEMENT
[1] : T1 [c] : c [0] : M0 [0-10] : 0 -10 ng/mL [Biopsy with Fusion] : Patient had a biopsy with fusion on [7(3+4)] : Fusion Biopsy Mansura Score: 7(3+4) [] : Patient had a Prostate MRI [4] : 4 [IIB] : IIB [BiopsyDate] : 01/22 [MeasuredProstateVolume] : 30 [TotalCores] : 14 [TotalPositiveCores] : 5 [MaxCoreInvolvement] : 80

## 2022-10-25 NOTE — VITALS
[Maximal Pain Intensity: 2/10] : 2/10 [Least Pain Intensity: 0/10] : 0/10 [80: Normal activity with effort; some signs or symptoms of disease.] : 80: Normal activity with effort; some signs or symptoms of disease.  [ECOG Performance Status: 1 - Restricted in physically strenuous activity but ambulatory and able to carry out work of a light or sedentary nature] : Performance Status: 1 - Restricted in physically strenuous activity but ambulatory and able to carry out work of a light or sedentary nature, e.g., light house work, office work
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
## 2022-12-14 DIAGNOSIS — R30.0 DYSURIA: ICD-10-CM

## 2022-12-15 ENCOUNTER — NON-APPOINTMENT (OUTPATIENT)
Age: 77
End: 2022-12-15

## 2023-01-17 RX ORDER — TAMSULOSIN HYDROCHLORIDE 0.4 MG/1
0.4 CAPSULE ORAL
Qty: 180 | Refills: 3 | Status: ACTIVE | COMMUNITY
Start: 2023-01-17 | End: 1900-01-01

## 2023-01-19 ENCOUNTER — TRANSCRIPTION ENCOUNTER (OUTPATIENT)
Age: 78
End: 2023-01-19

## 2023-01-30 ENCOUNTER — NON-APPOINTMENT (OUTPATIENT)
Age: 78
End: 2023-01-30

## 2023-03-16 ENCOUNTER — NON-APPOINTMENT (OUTPATIENT)
Age: 78
End: 2023-03-16

## 2023-04-25 RX ORDER — TAMSULOSIN HYDROCHLORIDE 0.4 MG/1
0.4 CAPSULE ORAL
Qty: 180 | Refills: 3 | Status: ACTIVE | COMMUNITY
Start: 2023-04-25 | End: 1900-01-01

## 2023-05-22 DIAGNOSIS — N52.9 MALE ERECTILE DYSFUNCTION, UNSPECIFIED: ICD-10-CM

## 2023-05-22 RX ORDER — SOLIFENACIN SUCCINATE 5 MG/1
5 TABLET ORAL
Qty: 30 | Refills: 5 | Status: COMPLETED | COMMUNITY
Start: 2023-01-17 | End: 2023-05-22

## 2023-05-22 RX ORDER — TADALAFIL 5 MG/1
5 TABLET ORAL
Qty: 90 | Refills: 3 | Status: ACTIVE | COMMUNITY
Start: 2023-05-22 | End: 1900-01-01

## 2023-06-06 ENCOUNTER — NON-APPOINTMENT (OUTPATIENT)
Age: 78
End: 2023-06-06

## 2023-06-06 RX ORDER — TADALAFIL 5 MG/1
5 TABLET ORAL
Qty: 90 | Refills: 3 | Status: ACTIVE | COMMUNITY
Start: 2023-06-06 | End: 1900-01-01

## 2023-07-12 ENCOUNTER — APPOINTMENT (OUTPATIENT)
Dept: RADIATION ONCOLOGY | Facility: CLINIC | Age: 78
End: 2023-07-12
Payer: MEDICARE

## 2023-07-12 DIAGNOSIS — Z80.42 FAMILY HISTORY OF MALIGNANT NEOPLASM OF PROSTATE: ICD-10-CM

## 2023-07-12 DIAGNOSIS — Z87.891 PERSONAL HISTORY OF NICOTINE DEPENDENCE: ICD-10-CM

## 2023-07-12 DIAGNOSIS — C34.91 MALIGNANT NEOPLASM OF UNSPECIFIED PART OF RIGHT BRONCHUS OR LUNG: ICD-10-CM

## 2023-07-12 DIAGNOSIS — Z78.9 OTHER SPECIFIED HEALTH STATUS: ICD-10-CM

## 2023-07-12 PROCEDURE — 99214 OFFICE O/P EST MOD 30 MIN: CPT | Mod: 25

## 2023-07-12 NOTE — DISEASE MANAGEMENT
[Clinical] : TNM Stage: c [I] : I [TTNM] : 1 [NTNM] : 0 [MTNM] : 0 [de-identified] : 4000cGy [de-identified] : 1) tip of nose 2) left temple 3) left dorsal forearm

## 2023-07-12 NOTE — HISTORY OF PRESENT ILLNESS
[FreeTextEntry1] : 76 y/o man with a history of prostate cancer (s/p Pd-103) in May 2022, and  stage I NSC Lung cancer s/p surgery in 2023. He has a history of cutaneous melanoma on back s/p wide local resection years ago, and recently biopsied SCCA of the skin over tip of nose, left temple and dorsum of left proximal forearm. \par He now presents in office alone for evaluation and consideration of definitive radiation to his skin malignancies.

## 2023-07-12 NOTE — PHYSICAL EXAM
[Normal] : oriented to person, place and time, the affect was normal, the mood was normal and not anxious [de-identified] : biopsy defect on tip of nose, left temple and dorsum of distal forearm .

## 2023-07-13 ENCOUNTER — APPOINTMENT (OUTPATIENT)
Dept: RADIATION ONCOLOGY | Facility: CLINIC | Age: 78
End: 2023-07-13

## 2023-07-19 ENCOUNTER — OUTPATIENT (OUTPATIENT)
Dept: OUTPATIENT SERVICES | Facility: HOSPITAL | Age: 78
LOS: 1 days | Discharge: HOME | End: 2023-07-19
Payer: MEDICARE

## 2023-07-19 DIAGNOSIS — M43.22 FUSION OF SPINE, CERVICAL REGION: Chronic | ICD-10-CM

## 2023-07-19 DIAGNOSIS — Z98.890 OTHER SPECIFIED POSTPROCEDURAL STATES: Chronic | ICD-10-CM

## 2023-07-19 PROCEDURE — 77334 RADIATION TREATMENT AID(S): CPT | Mod: 26

## 2023-07-19 PROCEDURE — 77263 THER RADIOLOGY TX PLNG CPLX: CPT

## 2023-07-19 PROCEDURE — 77290 THER RAD SIMULAJ FIELD CPLX: CPT | Mod: 26

## 2023-07-25 ENCOUNTER — RESULT REVIEW (OUTPATIENT)
Age: 78
End: 2023-07-25

## 2023-07-27 DIAGNOSIS — C61 MALIGNANT NEOPLASM OF PROSTATE: ICD-10-CM

## 2023-07-27 DIAGNOSIS — C44.329 SQUAMOUS CELL CARCINOMA OF SKIN OF OTHER PARTS OF FACE: ICD-10-CM

## 2023-08-03 ENCOUNTER — NON-APPOINTMENT (OUTPATIENT)
Age: 78
End: 2023-08-03

## 2023-08-03 VITALS
BODY MASS INDEX: 25.09 KG/M2 | OXYGEN SATURATION: 98 % | SYSTOLIC BLOOD PRESSURE: 138 MMHG | DIASTOLIC BLOOD PRESSURE: 69 MMHG | HEART RATE: 76 BPM | WEIGHT: 185 LBS | RESPIRATION RATE: 16 BRPM

## 2023-08-03 PROCEDURE — 77290 THER RAD SIMULAJ FIELD CPLX: CPT | Mod: 26

## 2023-08-03 PROCEDURE — 77334 RADIATION TREATMENT AID(S): CPT | Mod: 26

## 2023-08-03 PROCEDURE — 77768 HDR RDNCL SKN SURF BRACHYTX: CPT | Mod: 26

## 2023-08-07 ENCOUNTER — APPOINTMENT (OUTPATIENT)
Dept: MRI IMAGING | Facility: CLINIC | Age: 78
End: 2023-08-07
Payer: MEDICARE

## 2023-08-07 ENCOUNTER — OUTPATIENT (OUTPATIENT)
Dept: OUTPATIENT SERVICES | Facility: HOSPITAL | Age: 78
LOS: 1 days | End: 2023-08-07

## 2023-08-07 DIAGNOSIS — M43.22 FUSION OF SPINE, CERVICAL REGION: Chronic | ICD-10-CM

## 2023-08-07 DIAGNOSIS — Z98.890 OTHER SPECIFIED POSTPROCEDURAL STATES: Chronic | ICD-10-CM

## 2023-08-07 DIAGNOSIS — K51.90 ULCERATIVE COLITIS, UNSPECIFIED, WITHOUT COMPLICATIONS: ICD-10-CM

## 2023-08-07 PROCEDURE — 74018 RADEX ABDOMEN 1 VIEW: CPT | Mod: 26

## 2023-08-07 PROCEDURE — 77768 HDR RDNCL SKN SURF BRACHYTX: CPT | Mod: 26

## 2023-08-07 PROCEDURE — 72197 MRI PELVIS W/O & W/DYE: CPT | Mod: 26

## 2023-08-08 ENCOUNTER — APPOINTMENT (OUTPATIENT)
Dept: RADIATION ONCOLOGY | Facility: CLINIC | Age: 78
End: 2023-08-08
Payer: MEDICARE

## 2023-08-08 ENCOUNTER — NON-APPOINTMENT (OUTPATIENT)
Age: 78
End: 2023-08-08

## 2023-08-08 VITALS
SYSTOLIC BLOOD PRESSURE: 137 MMHG | TEMPERATURE: 97.88 F | RESPIRATION RATE: 16 BRPM | HEART RATE: 91 BPM | BODY MASS INDEX: 24.95 KG/M2 | DIASTOLIC BLOOD PRESSURE: 76 MMHG | OXYGEN SATURATION: 95 % | WEIGHT: 184.19 LBS | HEIGHT: 72 IN

## 2023-08-08 DIAGNOSIS — N94.89 OTHER SPECIFIED CONDITIONS ASSOCIATED WITH FEMALE GENITAL ORGANS AND MENSTRUAL CYCLE: ICD-10-CM

## 2023-08-08 PROCEDURE — 99213 OFFICE O/P EST LOW 20 MIN: CPT

## 2023-08-08 RX ORDER — BUDESONIDE 28 MG/1
2 AEROSOL, FOAM RECTAL
Qty: 66 | Refills: 0 | Status: DISCONTINUED | COMMUNITY
Start: 2022-03-31 | End: 2023-08-08

## 2023-08-08 RX ORDER — TAMSULOSIN HYDROCHLORIDE 0.4 MG/1
0.4 CAPSULE ORAL
Qty: 45 | Refills: 2 | Status: DISCONTINUED | COMMUNITY
Start: 2022-06-16 | End: 2023-08-08

## 2023-08-08 RX ORDER — OXYBUTYNIN CHLORIDE 5 MG/1
5 TABLET ORAL
Qty: 30 | Refills: 2 | Status: DISCONTINUED | COMMUNITY
Start: 2023-05-22 | End: 2023-08-08

## 2023-08-08 RX ORDER — TAMSULOSIN HYDROCHLORIDE 0.4 MG/1
0.4 CAPSULE ORAL
Qty: 180 | Refills: 3 | Status: DISCONTINUED | COMMUNITY
Start: 2022-09-07 | End: 2023-08-08

## 2023-08-08 RX ORDER — BACLOFEN 10 MG/1
10 TABLET ORAL TWICE DAILY
Qty: 60 | Refills: 0 | Status: ACTIVE | COMMUNITY
Start: 2023-08-08 | End: 1900-01-01

## 2023-08-08 RX ORDER — TADALAFIL 20 MG/1
20 TABLET ORAL DAILY
Qty: 90 | Refills: 2 | Status: DISCONTINUED | COMMUNITY
Start: 2022-10-25 | End: 2023-08-08

## 2023-08-08 RX ORDER — MESALAMINE 1000 MG/1
1000 SUPPOSITORY RECTAL
Qty: 90 | Refills: 0 | Status: DISCONTINUED | COMMUNITY
Start: 2022-09-01 | End: 2023-08-08
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
## 2023-08-08 NOTE — REVIEW OF SYSTEMS
[Patient Intake Form Reviewed] : Patient intake form was reviewed [Constipation] : no constipation [Diarrhea] : diarrhea [Nocturia] : nocturia [IPSS Score (0-40): ___] : IPSS score: [unfilled] [EPIC-CP Score (0-60): ___] : EPIC-CP score: [unfilled] [FreeTextEntry2] : cough [Diarrhea: Grade 1 - Increase of <4 stools per day over baseline; mild increase in ostomy output compared to baseline] : Diarrhea: Grade 1 - Increase of <4 stools per day over baseline; mild increase in ostomy output compared to baseline [Fecal Incontinence: Grade 1 - Occasional use of pads required] : Fecal Incontinence: Grade 1 - Occasional use of pads required [Proctitis: Grade 2 - Symptoms (e.g., rectal discomfort, passing blood or mucus); medical intervention indicated; limiting instrumental ADL] : Proctitis: Grade 2 - Symptoms (e.g., rectal discomfort, passing blood or mucus); medical intervention indicated; limiting instrumental ADL [Rectal Pain: Grade 1 - Mild pain] : Rectal Pain: Grade 1 - Mild pain [Urinary Incontinence: Grade 0] : Urinary Incontinence: Grade 0  [Urinary Retention: Grade 0] : Urinary Retention: Grade 0 [Urinary Tract Pain: Grade 1 - Mild pain] : Urinary Tract Pain: Grade 1 - Mild pain [Urinary Urgency: Grade 1 - Present] : Urinary Urgency: Grade 1 - Present [Urinary Frequency: Grade 1 - Present] : Urinary Frequency: Grade 1 - Present [Constipation: Grade 1 - Occasional or intermittent symptoms; occasional use of stool softeners, laxatives, dietary modification, or enema] : Constipation: Grade 1 - Occasional or intermittent symptoms; occasional use of stool softeners, laxatives, dietary modification, or enema [Proctitis: Grade 0] : Proctitis: Grade 0 [Urinary Retention: Grade 1 - Urinary, suprapubic or intermittent catheter placement not indicated; able to void with some residual] : Urinary Retention: Grade 1 - Urinary, suprapubic or intermittent catheter placement not indicated; able to void with some residual
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
## 2023-08-08 NOTE — DISEASE MANAGEMENT
[1] : T1 [c] : c [0] : M0 [0-10] : 0 -10 ng/mL [Biopsy with Fusion] : Patient had a biopsy with fusion on [7(3+4)] : Fusion Biopsy [place] Score: 7(3+4) [] : Patient had a Prostate MRI [4] : 4 [IIB] : IIB [Radiation Therapy] : Radiation Therapy [Treatment with radiation therapy] : Treatment with radiation therapy [LDR] : LDR [Clinical] : TNM Stage: c [BiopsyDate] : 01/22 [MeasuredProstateVolume] : 30 [TotalCores] : 14 [TotalPositiveCores] : 5 [MaxCoreInvolvement] : 80 [EBRT] : No EBRT [HDR] : No HDR [RadiationCompletedDate] : 5/11/22 [LDRDose] : 125 [LDRFractions] : 1 [MAURINadirDate] : 7/24/23 [PSANadirResults] : 0.11 [FirstPostOpPSADate] : 10/17/22 [FirstPostOpPSAResults] : 0.24 [TTNM] : 1c [NTNM] : 0 [MTNM] : 0

## 2023-08-08 NOTE — PHYSICAL EXAM
[General Appearance - In No Acute Distress] : in no acute distress [] : no respiratory distress [Exaggerated Use Of Accessory Muscles For Inspiration] : no accessory muscle use [Oriented To Time, Place, And Person] : oriented to person, place, and time [Affect] : the affect was normal [Outer Ear] : the ears and nose were normal in appearance [Respiration, Rhythm And Depth] : normal respiratory rhythm and effort [Abdomen Soft] : soft [Nondistended] : nondistended [Musculoskeletal - Swelling] : no joint swelling [Nail Clubbing] : no clubbing  or cyanosis of the fingernails [FreeTextEntry1] : televisit only, limited exam  [General Appearance - Well Developed] : well developed [General Appearance - Well Nourished] : well nourished [General Appearance - Alert] : alert [Sclera] : the sclera and conjunctiva were normal [Extraocular Movements] : extraocular movements were intact [Hearing Threshold Finger Rub Not [place]] : hearing was normal [Arterial Pulses Normal] : the arterial pulses were normal [Edema] : no peripheral edema present [Range of Motion to Joints] : range of motion to joints [Motor Tone] : muscle strength and tone were normal [No Focal Deficits] : no focal deficits [de-identified] : extensive sun related changes, skin lesions on face/scalp and arms.

## 2023-08-08 NOTE — DISEASE MANAGEMENT
[1] : T1 [c] : c [0] : M0 [0-10] : 0 -10 ng/mL [Biopsy with Fusion] : Patient had a biopsy with fusion on [7(3+4)] : Fusion Biopsy [place] Score: 7(3+4) [] : Patient had a Prostate MRI [4] : 4 [IIB] : IIB [Radiation Therapy] : Radiation Therapy [Treatment with radiation therapy] : Treatment with radiation therapy [LDR] : LDR [Clinical] : TNM Stage: c [BiopsyDate] : 01/22 [MeasuredProstateVolume] : 30 [TotalCores] : 14 [TotalPositiveCores] : 5 [MaxCoreInvolvement] : 80 [EBRT] : No EBRT [HDR] : No HDR [RadiationCompletedDate] : 5/11/22 [LDRDose] : 125 [LDRFractions] : 1 [MAURINadirDate] : 7/24/23 [PSANadirResults] : 0.11 [FirstPostOpPSADate] : 10/17/22 [FirstPostOpPSAResults] : 0.24 [TTNM] : 1c [NTNM] : 0 [MTNM] : 0

## 2023-08-09 ENCOUNTER — APPOINTMENT (OUTPATIENT)
Dept: DERMATOLOGY | Facility: CLINIC | Age: 78
End: 2023-08-09
Payer: MEDICARE

## 2023-08-09 PROCEDURE — 99204 OFFICE O/P NEW MOD 45 MIN: CPT

## 2023-08-10 ENCOUNTER — NON-APPOINTMENT (OUTPATIENT)
Age: 78
End: 2023-08-10

## 2023-08-10 VITALS
OXYGEN SATURATION: 99 % | HEART RATE: 80 BPM | RESPIRATION RATE: 14 BRPM | SYSTOLIC BLOOD PRESSURE: 135 MMHG | DIASTOLIC BLOOD PRESSURE: 71 MMHG

## 2023-08-10 PROCEDURE — 77768 HDR RDNCL SKN SURF BRACHYTX: CPT | Mod: 26

## 2023-08-14 ENCOUNTER — NON-APPOINTMENT (OUTPATIENT)
Age: 78
End: 2023-08-14

## 2023-08-14 VITALS
WEIGHT: 185 LBS | RESPIRATION RATE: 16 BRPM | SYSTOLIC BLOOD PRESSURE: 140 MMHG | BODY MASS INDEX: 25.09 KG/M2 | DIASTOLIC BLOOD PRESSURE: 72 MMHG | HEART RATE: 76 BPM | OXYGEN SATURATION: 99 %

## 2023-08-14 PROCEDURE — 77768 HDR RDNCL SKN SURF BRACHYTX: CPT | Mod: 26

## 2023-08-17 ENCOUNTER — NON-APPOINTMENT (OUTPATIENT)
Age: 78
End: 2023-08-17

## 2023-08-17 PROCEDURE — 77768 HDR RDNCL SKN SURF BRACHYTX: CPT | Mod: 26

## 2023-08-17 NOTE — DISEASE MANAGEMENT
[FreeTextEntry4] : squamous cell carcinoma [TTNM] : 1 [NTNM] : 0 [MTNM] : 0 [de-identified] : 2500cGy each site [de-identified] : 4000 cGy each site [de-identified] : left arm dorsum, left temple, bridge of nose

## 2023-08-21 PROCEDURE — 77768 HDR RDNCL SKN SURF BRACHYTX: CPT | Mod: 26

## 2023-08-21 NOTE — DISEASE MANAGEMENT
[Clinical] : TNM Stage: c [FreeTextEntry4] : squamous cell carcinoma [TTNM] : 1 [NTNM] : 0 [MTNM] : 0 [I] : I [de-identified] : 3000 cGy each site [de-identified] : 4000 cGy each site [de-identified] : left arm dorsum, left temple, bridge of nose, upper back (500 cGy)

## 2023-08-24 PROCEDURE — 77768 HDR RDNCL SKN SURF BRACHYTX: CPT | Mod: 26

## 2023-08-28 ENCOUNTER — NON-APPOINTMENT (OUTPATIENT)
Age: 78
End: 2023-08-28

## 2023-08-28 VITALS
WEIGHT: 185 LBS | OXYGEN SATURATION: 98 % | DIASTOLIC BLOOD PRESSURE: 72 MMHG | RESPIRATION RATE: 16 BRPM | SYSTOLIC BLOOD PRESSURE: 137 MMHG | BODY MASS INDEX: 25.09 KG/M2 | HEART RATE: 78 BPM

## 2023-08-28 DIAGNOSIS — L58.0 ACUTE RADIODERMATITIS: ICD-10-CM

## 2023-08-28 PROCEDURE — 77768 HDR RDNCL SKN SURF BRACHYTX: CPT | Mod: 26

## 2023-08-28 RX ORDER — SILVER SULFADIAZINE 10 MG/G
1 CREAM TOPICAL
Qty: 1 | Refills: 2 | Status: ACTIVE | COMMUNITY
Start: 2023-08-28 | End: 1900-01-01

## 2023-08-28 NOTE — DISEASE MANAGEMENT
[Clinical] : TNM Stage: c [FreeTextEntry4] : squamous cell carcinoma [TTNM] : 1 [NTNM] : 0 [MTNM] : 0 [I] : I [de-identified] : 4000 cGy each site [de-identified] : 4000 cGy each site [de-identified] : left arm dorsum, left temple, bridge of nose, upper back (3000 cGy)

## 2023-08-31 PROCEDURE — 77768 HDR RDNCL SKN SURF BRACHYTX: CPT | Mod: 26

## 2023-09-05 ENCOUNTER — NON-APPOINTMENT (OUTPATIENT)
Age: 78
End: 2023-09-05

## 2023-09-05 VITALS
RESPIRATION RATE: 16 BRPM | SYSTOLIC BLOOD PRESSURE: 129 MMHG | WEIGHT: 185 LBS | DIASTOLIC BLOOD PRESSURE: 73 MMHG | HEART RATE: 93 BPM | BODY MASS INDEX: 25.09 KG/M2 | OXYGEN SATURATION: 96 %

## 2023-09-05 PROCEDURE — 77768 HDR RDNCL SKN SURF BRACHYTX: CPT | Mod: 26

## 2023-09-05 NOTE — VITALS
[Maximal Pain Intensity: 2/10] : 2/10 [Least Pain Intensity: 0/10] : 0/10 [Pain Description/Quality: ___] : Pain description/quality: [unfilled] [Pain Duration: ___] : Pain duration: [unfilled] [Pain Location: ___] : Pain Location: [unfilled] [Pain Interferes with ADLs] : Pain interferes with activities of daily living. [OTC] : OTC [80: Normal activity with effort; some signs or symptoms of disease.] : 80: Normal activity with effort; some signs or symptoms of disease.  [ECOG Performance Status: 2 - Ambulatory and capable of all self care but unable to carry out any work activities] : Performance Status: 2 - Ambulatory and capable of all self care but unable to carry out any work activities. Up and about more than 50% of waking hours

## 2023-09-15 ENCOUNTER — NON-APPOINTMENT (OUTPATIENT)
Age: 78
End: 2023-09-15

## 2023-12-06 ENCOUNTER — APPOINTMENT (OUTPATIENT)
Dept: RADIATION ONCOLOGY | Facility: CLINIC | Age: 78
End: 2023-12-06
Payer: MEDICARE

## 2023-12-06 VITALS
HEART RATE: 78 BPM | BODY MASS INDEX: 25.09 KG/M2 | OXYGEN SATURATION: 95 % | DIASTOLIC BLOOD PRESSURE: 78 MMHG | RESPIRATION RATE: 16 BRPM | WEIGHT: 185 LBS | SYSTOLIC BLOOD PRESSURE: 135 MMHG

## 2023-12-06 DIAGNOSIS — C44.92 SQUAMOUS CELL CARCINOMA OF SKIN, UNSPECIFIED: ICD-10-CM

## 2023-12-06 DIAGNOSIS — R52 PAIN, UNSPECIFIED: ICD-10-CM

## 2023-12-06 PROCEDURE — 99024 POSTOP FOLLOW-UP VISIT: CPT

## 2023-12-06 RX ORDER — TRAMADOL HYDROCHLORIDE 50 MG/1
50 TABLET, COATED ORAL
Qty: 30 | Refills: 0 | Status: ACTIVE | COMMUNITY
Start: 2023-12-06 | End: 1900-01-01

## 2023-12-12 ENCOUNTER — APPOINTMENT (OUTPATIENT)
Dept: UROLOGY | Facility: CLINIC | Age: 78
End: 2023-12-12
Payer: MEDICARE

## 2023-12-12 VITALS
WEIGHT: 185 LBS | BODY MASS INDEX: 25.06 KG/M2 | HEIGHT: 72 IN | SYSTOLIC BLOOD PRESSURE: 115 MMHG | RESPIRATION RATE: 17 BRPM | HEART RATE: 101 BPM | DIASTOLIC BLOOD PRESSURE: 61 MMHG | TEMPERATURE: 208.94 F

## 2023-12-12 DIAGNOSIS — C61 MALIGNANT NEOPLASM OF PROSTATE: ICD-10-CM

## 2023-12-12 DIAGNOSIS — N28.1 CYST OF KIDNEY, ACQUIRED: ICD-10-CM

## 2023-12-12 DIAGNOSIS — R97.20 ELEVATED PROSTATE, SPECIFIC ANTIGEN [PSA]: ICD-10-CM

## 2023-12-12 PROCEDURE — 99213 OFFICE O/P EST LOW 20 MIN: CPT

## 2023-12-27 NOTE — DISEASE MANAGEMENT
[Clinical] : TNM Stage: c [TTNM] : 1 [NTNM] : 0 [MTNM] : 0 [I] : I [de-identified] : 4000cGy [de-identified] : the skin over tip of nose, left temple and dorsum of left proximal forearm.

## 2023-12-27 NOTE — DISEASE MANAGEMENT
[Clinical] : TNM Stage: c [I] : I [FreeTextEntry4] : squamous cell carcinoma [TTNM] : 1 [NTNM] : 0 [MTNM] : 0 [de-identified] : 4000 cGy each site [de-identified] : 4000 cGy each site [de-identified] : upper back

## 2023-12-30 ENCOUNTER — NON-APPOINTMENT (OUTPATIENT)
Age: 78
End: 2023-12-30

## 2024-05-13 RX ORDER — HYDROCORTISONE ACETATE, PRAMOXINE HCL 2.5; 1 G/100G; G/100G
2.5-1 CREAM TOPICAL
Qty: 6 | Refills: 3 | Status: ACTIVE | COMMUNITY
Start: 2023-09-29 | End: 1900-01-01

## 2024-08-01 ENCOUNTER — APPOINTMENT (OUTPATIENT)
Dept: RADIATION ONCOLOGY | Facility: CLINIC | Age: 79
End: 2024-08-01
Payer: MEDICARE

## 2024-08-01 VITALS
OXYGEN SATURATION: 94 % | BODY MASS INDEX: 25.33 KG/M2 | HEART RATE: 100 BPM | HEIGHT: 72 IN | SYSTOLIC BLOOD PRESSURE: 146 MMHG | WEIGHT: 187 LBS | RESPIRATION RATE: 16 BRPM | DIASTOLIC BLOOD PRESSURE: 69 MMHG

## 2024-08-01 DIAGNOSIS — C44.92 SQUAMOUS CELL CARCINOMA OF SKIN, UNSPECIFIED: ICD-10-CM

## 2024-08-01 DIAGNOSIS — H35.9 UNSPECIFIED RETINAL DISORDER: ICD-10-CM

## 2024-08-01 DIAGNOSIS — C61 MALIGNANT NEOPLASM OF PROSTATE: ICD-10-CM

## 2024-08-01 DIAGNOSIS — C34.91 MALIGNANT NEOPLASM OF UNSPECIFIED PART OF RIGHT BRONCHUS OR LUNG: ICD-10-CM

## 2024-08-01 PROCEDURE — G2211 COMPLEX E/M VISIT ADD ON: CPT

## 2024-08-01 PROCEDURE — 99214 OFFICE O/P EST MOD 30 MIN: CPT

## 2024-08-03 PROBLEM — H35.9: Status: ACTIVE | Noted: 2024-08-03

## 2024-08-03 NOTE — LETTER CLOSING
[Sincerely yours,] : Sincerely yours, [FreeTextEntry3] : Harshad De Luna MD Physician in Chief Department of Radiation Medicine [facility]   of Radiation Medicine [employer] at  [facility]  Radiation  [facility]/ [facility] at [place] [address]  Tel: [phone] Fax: [phone]

## 2024-08-03 NOTE — ASSESSMENT
[FreeTextEntry1] : Mostly resolved treatment related sequelae.   Questionable  right retinal lesion per detailed eye exam in [place]

## 2024-08-03 NOTE — HISTORY OF PRESENT ILLNESS
[FreeTextEntry1] : The patient is a 77 y/o man with a history of prostate CA, lung CA and now squamous cell carcinoma of the skin in multiple sites.  He completed definitive superficial radiation therapy to the above-mentioned sites.    Mr. Perkins presents today for follow-up after spending the winter in [place].  He was also following with Dr. Timmons for prostate cancer treatment.  He is transferring care to Dr. De Luna since Dr. Timmons left the radiation practice.  Skin bridge of nose fingertip sized area smooth and intact. Skin left temple with smooth intact skin. Skin left arm dorsum with smooth intact skin. Right upper back -- skin intact without scaling or redness. Reports new lesion right anterior thigh.  Multicolor, irregular plaque-like lesion noted.  Advised to follow-up with dermatology.  Mr. Perkins notes he has concern for an abnormal finding in the right  eye retina .  He is referred to retinal specialist on [place].  Patient has received Antivio infusions for ulcerative colitis at the direction of Dr. Henry in [place].  Reports he follows with Dr. Gallardo at [facility] for lung cancer surveillance.  Reports having spine surgeries in February and April 2024 any back pain is well controlled with Tylenol.

## 2024-08-03 NOTE — PHYSICAL EXAM
[Normal] : oriented to person, place and time, the affect was normal, the mood was normal and not anxious [de-identified] : unable to visualize retina adequately due to not dilating pupil [de-identified] : Using walker since back surgery [de-identified] : widespread actinic keratoses arms and neck, 1.5 cm lesion right anterior thigh; well healed lower back and left lower abdominal surgical scars

## 2024-08-03 NOTE — VITALS
[Maximal Pain Intensity: 2/10] : 2/10 [Least Pain Intensity: 0/10] : 0/10 [Pain Description/Quality: ___] : Pain description/quality: [unfilled] [Pain Duration: ___] : Pain duration: [unfilled] [Pain Location: ___] : Pain Location: [unfilled] [Pain Interferes with ADLs] : Pain interferes with activities of daily living. [OTC] : OTC [80: Normal activity with effort; some signs or symptoms of disease.] : 80: Normal activity with effort; some signs or symptoms of disease.  [ECOG Performance Status: 2 - Ambulatory and capable of all self care but unable to carry out any work activities] : Performance Status: 2 - Ambulatory and capable of all self care but unable to carry out any work activities. Up and about more than 50% of waking hours [Maximal Pain Intensity: 4/10] : 4/10 [Least Pain Intensity: 1/10] : 1/10 [70: Cares for self; unalbe to carry on normal activity or do active work.] : 70: Cares for self; unable to carry on normal activity or do active work.

## 2024-08-03 NOTE — REVIEW OF SYSTEMS
[IPSS Score (0-40): ___] : IPSS score: [unfilled] [EPIC-CP Score (0-60): ___] : EPIC-CP score: [unfilled] [Disturbance Of Gait] : gait disturbance [Negative] : Allergic/Immunologic [FreeTextEntry3] : told by a retinal specialist that he has a "Freckle" on his right posterior retiina. [FreeTextEntry9] : using walker after fusion surgery to lumbar spine. [de-identified] : history of squamous cell carcinoma of skin at multiple sites

## 2024-08-03 NOTE — HISTORY OF PRESENT ILLNESS
[FreeTextEntry1] : The patient is a 77 y/o man with a history of prostate CA, lung CA and now squamous cell carcinoma of the skin in multiple sites.  He completed definitive superficial radiation therapy to the above-mentioned sites.    Mr. Perkins presents today for follow-up after spending the winter in [place].  He was also following with Dr. Timmons for prostate cancer treatment.  He is transferring care to Dr. De Luna since Dr. Timmons left the radiation practice.  Skin bridge of nose fingertip sized area smooth and intact. Skin left temple with smooth intact skin. Skin left arm dorsum with smooth intact skin. Right upper back -- skin intact without scaling or redness. Reports new lesion right anterior thigh.  Multicolor, irregular plaque-like lesion noted.  Advised to follow-up with dermatology.  Mr. Perkins notes he has concern for an abnormal finding in the right  eye retina .  He is referred to retinal specialist on [place].  Patient has received Antivio infusions for ulcerative colitis at the direction of Dr. Henry in [place].  Reports he follows with Dr. Gallardo at [facility] for lung cancer surveillance.  Reports having spine surgeries in February and April 2024 any back pain is well controlled with Tylenol.

## 2024-08-03 NOTE — DISEASE MANAGEMENT
[Clinical] : TNM Stage: c [I] : I [FreeTextEntry4] : squamous cell carcinoma [TTNM] : 1 [NTNM] : 0 [MTNM] : 0 [de-identified] : 4000 cGy each site [de-identified] : upper back, left arm dorsum, left temple, bridge of nose

## 2024-08-03 NOTE — REVIEW OF SYSTEMS
[IPSS Score (0-40): ___] : IPSS score: [unfilled] [EPIC-CP Score (0-60): ___] : EPIC-CP score: [unfilled] [Disturbance Of Gait] : gait disturbance [Negative] : Allergic/Immunologic [FreeTextEntry3] : told by a retinal specialist that he has a "Freckle" on his right posterior retiina. [FreeTextEntry9] : using walker after fusion surgery to lumbar spine. [de-identified] : history of squamous cell carcinoma of skin at multiple sites

## 2024-08-03 NOTE — PHYSICAL EXAM
[Normal] : oriented to person, place and time, the affect was normal, the mood was normal and not anxious [de-identified] : unable to visualize retina adequately due to not dilating pupil [de-identified] : Using walker since back surgery [de-identified] : widespread actinic keratoses arms and neck, 1.5 cm lesion right anterior thigh; well healed lower back and left lower abdominal surgical scars

## 2024-08-03 NOTE — DISEASE MANAGEMENT
[Clinical] : TNM Stage: c [I] : I [FreeTextEntry4] : squamous cell carcinoma [TTNM] : 1 [NTNM] : 0 [MTNM] : 0 [de-identified] : 4000 cGy each site [de-identified] : upper back, left arm dorsum, left temple, bridge of nose

## 2024-08-03 NOTE — LETTER CLOSING
[Sincerely yours,] : Sincerely yours, [FreeTextEntry3] : Harshad De Luna MD Physician in Chief Department of Radiation Medicine [facility]   of Radiation Medicine [employer] at  \[facility] [place] [facility]\""/[facility]  Radiation  [facility]/ [facility] at [place] [address]  Tel: [phone] Fax: [phone]

## 2024-10-31 NOTE — ASU DISCHARGE PLAN (ADULT/PEDIATRIC) - DRIVING
Patient was educated/instructed on their diagnosis, treatment and medications prior to discharge from the clinic today. Patient advised to call the office with any questions or concerns.    No...

## 2024-12-07 ENCOUNTER — NON-APPOINTMENT (OUTPATIENT)
Age: 79
End: 2024-12-07

## 2025-05-30 ENCOUNTER — INPATIENT (INPATIENT)
Facility: HOSPITAL | Age: 80
LOS: 4 days | Discharge: NURSING, REHAB OR LONG-TERM CARE | DRG: 84 | End: 2025-06-04
Attending: STUDENT IN AN ORGANIZED HEALTH CARE EDUCATION/TRAINING PROGRAM | Admitting: STUDENT IN AN ORGANIZED HEALTH CARE EDUCATION/TRAINING PROGRAM
Payer: MEDICARE

## 2025-05-30 VITALS
HEART RATE: 80 BPM | SYSTOLIC BLOOD PRESSURE: 112 MMHG | RESPIRATION RATE: 20 BRPM | OXYGEN SATURATION: 98 % | DIASTOLIC BLOOD PRESSURE: 66 MMHG | TEMPERATURE: 98 F | WEIGHT: 188.05 LBS

## 2025-05-30 DIAGNOSIS — Z98.890 OTHER SPECIFIED POSTPROCEDURAL STATES: Chronic | ICD-10-CM

## 2025-05-30 DIAGNOSIS — S06.5XAA TRAUMATIC SUBDURAL HEMORRHAGE WITH LOSS OF CONSCIOUSNESS STATUS UNKNOWN, INITIAL ENCOUNTER: ICD-10-CM

## 2025-05-30 DIAGNOSIS — M43.22 FUSION OF SPINE, CERVICAL REGION: Chronic | ICD-10-CM

## 2025-05-30 LAB
ALBUMIN SERPL ELPH-MCNC: 3.7 G/DL — SIGNIFICANT CHANGE UP (ref 3.3–5.2)
ALP SERPL-CCNC: 74 U/L — SIGNIFICANT CHANGE UP (ref 40–120)
ALT FLD-CCNC: 13 U/L — SIGNIFICANT CHANGE UP
ANION GAP SERPL CALC-SCNC: 15 MMOL/L — SIGNIFICANT CHANGE UP (ref 5–17)
ANISOCYTOSIS BLD QL: SLIGHT — SIGNIFICANT CHANGE UP
APTT BLD: 32.9 SEC — SIGNIFICANT CHANGE UP (ref 26.1–36.8)
AST SERPL-CCNC: 18 U/L — SIGNIFICANT CHANGE UP
BASOPHILS # BLD AUTO: 0.02 K/UL — SIGNIFICANT CHANGE UP (ref 0–0.2)
BASOPHILS # BLD MANUAL: 0 K/UL — SIGNIFICANT CHANGE UP (ref 0–0.2)
BASOPHILS NFR BLD AUTO: 0.6 % — SIGNIFICANT CHANGE UP (ref 0–2)
BASOPHILS NFR BLD MANUAL: 0 % — SIGNIFICANT CHANGE UP (ref 0–2)
BILIRUB SERPL-MCNC: 0.5 MG/DL — SIGNIFICANT CHANGE UP (ref 0.4–2)
BLD GP AB SCN SERPL QL: SIGNIFICANT CHANGE UP
BUN SERPL-MCNC: 22 MG/DL — HIGH (ref 8–20)
CALCIUM SERPL-MCNC: 8.6 MG/DL — SIGNIFICANT CHANGE UP (ref 8.4–10.5)
CHLORIDE SERPL-SCNC: 102 MMOL/L — SIGNIFICANT CHANGE UP (ref 96–108)
CO2 SERPL-SCNC: 21 MMOL/L — LOW (ref 22–29)
CREAT SERPL-MCNC: 0.97 MG/DL — SIGNIFICANT CHANGE UP (ref 0.5–1.3)
EGFR: 80 ML/MIN/1.73M2 — SIGNIFICANT CHANGE UP
EGFR: 80 ML/MIN/1.73M2 — SIGNIFICANT CHANGE UP
EOSINOPHIL # BLD AUTO: 0.06 K/UL — SIGNIFICANT CHANGE UP (ref 0–0.5)
EOSINOPHIL # BLD MANUAL: 0.11 K/UL — SIGNIFICANT CHANGE UP (ref 0–0.5)
EOSINOPHIL NFR BLD AUTO: 1.9 % — SIGNIFICANT CHANGE UP (ref 0–6)
EOSINOPHIL NFR BLD MANUAL: 3.5 % — SIGNIFICANT CHANGE UP (ref 0–6)
GIANT PLATELETS BLD QL SMEAR: PRESENT
GLUCOSE SERPL-MCNC: 86 MG/DL — SIGNIFICANT CHANGE UP (ref 70–99)
HCT VFR BLD CALC: 39.3 % — SIGNIFICANT CHANGE UP (ref 39–50)
HGB BLD-MCNC: 12.6 G/DL — LOW (ref 13–17)
HYPOCHROMIA BLD QL: SLIGHT — SIGNIFICANT CHANGE UP
IMM GRANULOCYTES # BLD AUTO: 0.01 K/UL — SIGNIFICANT CHANGE UP (ref 0–0.07)
IMM GRANULOCYTES NFR BLD AUTO: 0.3 % — SIGNIFICANT CHANGE UP (ref 0–0.9)
INR BLD: 1.04 RATIO — SIGNIFICANT CHANGE UP (ref 0.85–1.16)
LYMPHOCYTES # BLD AUTO: 0.92 K/UL — LOW (ref 1–3.3)
LYMPHOCYTES # BLD MANUAL: 0.57 K/UL — LOW (ref 1–3.3)
LYMPHOCYTES NFR BLD AUTO: 29.6 % — SIGNIFICANT CHANGE UP (ref 13–44)
LYMPHOCYTES NFR BLD MANUAL: 18.3 % — SIGNIFICANT CHANGE UP (ref 13–44)
MAGNESIUM SERPL-MCNC: 2 MG/DL — SIGNIFICANT CHANGE UP (ref 1.6–2.6)
MANUAL NEUTROPHIL BANDS #: 0.05 K/UL — SIGNIFICANT CHANGE UP (ref 0–0.84)
MANUAL REACTIVE LYMPHOCYTES #: 0.08 K/UL — SIGNIFICANT CHANGE UP (ref 0–0.63)
MCHC RBC-ENTMCNC: 29.5 PG — SIGNIFICANT CHANGE UP (ref 27–34)
MCHC RBC-ENTMCNC: 32.1 G/DL — SIGNIFICANT CHANGE UP (ref 32–36)
MCV RBC AUTO: 92 FL — SIGNIFICANT CHANGE UP (ref 80–100)
MICROCYTES BLD QL: SLIGHT — SIGNIFICANT CHANGE UP
MONOCYTES # BLD AUTO: 0.45 K/UL — SIGNIFICANT CHANGE UP (ref 0–0.9)
MONOCYTES # BLD MANUAL: 0.38 K/UL — SIGNIFICANT CHANGE UP (ref 0–0.9)
MONOCYTES NFR BLD AUTO: 14.5 % — HIGH (ref 2–14)
MONOCYTES NFR BLD MANUAL: 12.2 % — SIGNIFICANT CHANGE UP (ref 2–14)
NEUTROPHILS # BLD AUTO: 1.65 K/UL — LOW (ref 1.8–7.4)
NEUTROPHILS # BLD MANUAL: 1.92 K/UL — SIGNIFICANT CHANGE UP (ref 1.8–7.4)
NEUTROPHILS NFR BLD AUTO: 53.1 % — SIGNIFICANT CHANGE UP (ref 43–77)
NEUTROPHILS NFR BLD MANUAL: 61.7 % — SIGNIFICANT CHANGE UP (ref 43–77)
NEUTS BAND # BLD: 1.7 % — SIGNIFICANT CHANGE UP (ref 0–8)
NEUTS BAND NFR BLD: 1.7 % — SIGNIFICANT CHANGE UP (ref 0–8)
NRBC # BLD AUTO: 0 K/UL — SIGNIFICANT CHANGE UP (ref 0–0)
NRBC # FLD: 0 K/UL — SIGNIFICANT CHANGE UP (ref 0–0)
NRBC BLD AUTO-RTO: 0 /100 WBCS — SIGNIFICANT CHANGE UP (ref 0–0)
PHOSPHATE SERPL-MCNC: 3 MG/DL — SIGNIFICANT CHANGE UP (ref 2.4–4.7)
PLAT MORPH BLD: NORMAL — SIGNIFICANT CHANGE UP
PLATELET # BLD AUTO: 171 K/UL — SIGNIFICANT CHANGE UP (ref 150–400)
PMV BLD: 9.4 FL — SIGNIFICANT CHANGE UP (ref 7–13)
POLYCHROMASIA BLD QL SMEAR: SLIGHT — SIGNIFICANT CHANGE UP
POTASSIUM SERPL-MCNC: 4.3 MMOL/L — SIGNIFICANT CHANGE UP (ref 3.5–5.3)
POTASSIUM SERPL-SCNC: 4.3 MMOL/L — SIGNIFICANT CHANGE UP (ref 3.5–5.3)
PROT SERPL-MCNC: 6.2 G/DL — LOW (ref 6.6–8.7)
PROTHROM AB SERPL-ACNC: 11.8 SEC — SIGNIFICANT CHANGE UP (ref 9.9–13.4)
RBC # BLD: 4.27 M/UL — SIGNIFICANT CHANGE UP (ref 4.2–5.8)
RBC # FLD: 13.1 % — SIGNIFICANT CHANGE UP (ref 10.3–14.5)
RBC BLD AUTO: ABNORMAL
SODIUM SERPL-SCNC: 138 MMOL/L — SIGNIFICANT CHANGE UP (ref 135–145)
VARIANT LYMPHS # BLD: 2.6 % — SIGNIFICANT CHANGE UP (ref 0–6)
VARIANT LYMPHS NFR BLD MANUAL: 2.6 % — SIGNIFICANT CHANGE UP (ref 0–6)
WBC # BLD: 3.11 K/UL — LOW (ref 3.8–10.5)
WBC # FLD AUTO: 3.11 K/UL — LOW (ref 3.8–10.5)

## 2025-05-30 PROCEDURE — 70450 CT HEAD/BRAIN W/O DYE: CPT | Mod: 26,77

## 2025-05-30 PROCEDURE — 93010 ELECTROCARDIOGRAM REPORT: CPT

## 2025-05-30 PROCEDURE — 99222 1ST HOSP IP/OBS MODERATE 55: CPT

## 2025-05-30 PROCEDURE — 72125 CT NECK SPINE W/O DYE: CPT | Mod: 26

## 2025-05-30 PROCEDURE — 99291 CRITICAL CARE FIRST HOUR: CPT

## 2025-05-30 PROCEDURE — 72192 CT PELVIS W/O DYE: CPT | Mod: 26

## 2025-05-30 PROCEDURE — 70450 CT HEAD/BRAIN W/O DYE: CPT | Mod: 26

## 2025-05-30 PROCEDURE — 71045 X-RAY EXAM CHEST 1 VIEW: CPT | Mod: 26

## 2025-05-30 PROCEDURE — 99285 EMERGENCY DEPT VISIT HI MDM: CPT

## 2025-05-30 RX ORDER — ACETAMINOPHEN 500 MG/5ML
650 LIQUID (ML) ORAL ONCE
Refills: 0 | Status: COMPLETED | OUTPATIENT
Start: 2025-05-30 | End: 2025-05-30

## 2025-05-30 RX ORDER — MAGNESIUM SULFATE 500 MG/ML
1 SYRINGE (ML) INJECTION ONCE
Refills: 0 | Status: COMPLETED | OUTPATIENT
Start: 2025-05-30 | End: 2025-05-31

## 2025-05-30 RX ORDER — ACETAMINOPHEN 500 MG/5ML
1000 LIQUID (ML) ORAL ONCE
Refills: 0 | Status: COMPLETED | OUTPATIENT
Start: 2025-05-30 | End: 2025-05-30

## 2025-05-30 RX ORDER — POVIDONE-IODINE 7.5 %
1 SOLUTION, NON-ORAL TOPICAL ONCE
Refills: 0 | Status: DISCONTINUED | OUTPATIENT
Start: 2025-05-30 | End: 2025-06-01

## 2025-05-30 RX ORDER — ACETAMINOPHEN 500 MG/5ML
650 LIQUID (ML) ORAL EVERY 6 HOURS
Refills: 0 | Status: DISCONTINUED | OUTPATIENT
Start: 2025-05-30 | End: 2025-05-30

## 2025-05-30 RX ORDER — LEVETIRACETAM 10 MG/ML
500 INJECTION, SOLUTION INTRAVENOUS EVERY 12 HOURS
Refills: 0 | Status: DISCONTINUED | OUTPATIENT
Start: 2025-05-30 | End: 2025-06-01

## 2025-05-30 RX ORDER — LABETALOL HYDROCHLORIDE 200 MG/1
10 TABLET, FILM COATED ORAL
Refills: 0 | Status: DISCONTINUED | OUTPATIENT
Start: 2025-05-30 | End: 2025-06-01

## 2025-05-30 RX ORDER — METOCLOPRAMIDE HCL 10 MG
10 TABLET ORAL ONCE
Refills: 0 | Status: COMPLETED | OUTPATIENT
Start: 2025-05-30 | End: 2025-05-31

## 2025-05-30 RX ADMIN — Medication 50 MILLILITER(S): at 18:09

## 2025-05-30 RX ADMIN — LEVETIRACETAM 500 MILLIGRAM(S): 10 INJECTION, SOLUTION INTRAVENOUS at 18:15

## 2025-05-30 RX ADMIN — Medication 650 MILLIGRAM(S): at 14:33

## 2025-05-30 RX ADMIN — Medication 400 MILLIGRAM(S): at 20:07

## 2025-05-30 NOTE — H&P ADULT - ASSESSMENT
ASSESSMENT:    79yM extensive PMH including 2 cardiac stents several years ago originally on Plavix but then stopped 2/2 ulcerative colitis and never went on ASA, remote history of afib after pulmonary testing several years ago originally on Eliquis but it flared up his ulcerative colitis so came off of it and hasn't heard about arrhythmia since then, h/o anterior approach lumbar fusion in [place], [place] 1 year ago 5/28/24, now presents to ED s/p fall Saturday 5/24 without LOC, since fall has had difficulty walking and headaches unrelieved (at worst 5/10, currently 3/10) despite taking Tylenol/Advil.  - CTH with 2.4 cm acute on chronic SDH with mild mass effect  - No evidence of focal neurologic deficit    PLAN:    NEURO:  - Q1 neuro checks, HOB 30 degrees  - Repeat CTH 6 hours from original, ~ 22:00, STAT with any change in mental status  - Keppra 500 Q12  - Tertiary trauma exam pending  - Tentatively planned for MMA embolization in AM with Dr. Reid and possible twist drill SDH evacuation post embolizaton  - Pain control: Tylenol 650mg PRN, Avoid Over sedation    PULM: RA  - Incentive Spirometry as tolerated    CV:  - SBP goal: 100-160  - PRN: Labetalol, Hydralazine    RENAL:  - Fluids: NS @ 50 while NPO  - Monitor Electrolytes and replete as needed    GI:  - Diet: NPO except meds  - GI prophylaxis [x] not indicated [] PPI [] other:  - Bowel regimen [] colace [x] senna [] other: MiraLAX    ENDO:   - Goal euglycemia (-180)  - ISS while NPO    HEME/ONC:  - VTE prophylaxis: [] SCDs [] chemoprophylaxis     ID: Afebrile  - Monitor Fever Curve    CODE STATUS:  [x] Full Code [] DNR [] DNI [] Palliative/Comfort Care    DISPOSITION:  [x] ICU [] Stroke Unit [] Floor [] EMU [] RCU [] PCU    [x] Patient is at high risk of neurologic deterioration/death due to: Herniation    Time spent: 60 critical care minutes ASSESSMENT:    79yM extensive PMH including 2 cardiac stents several years ago originally on Plavix but then stopped 2/2 ulcerative colitis and never went on ASA, remote history of afib after pulmonary testing several years ago originally on Eliquis but it flared up his ulcerative colitis so came off of it and hasn't heard about arrhythmia since then, h/o anterior approach lumbar fusion in [place], [place] 1 year ago 5/28/24, now presents to ED s/p fall Saturday 5/24 without LOC, since fall has had difficulty walking and headaches unrelieved (at worst 5/10, currently 3/10) despite taking Tylenol/Advil.  - CTH with 2.4 cm acute on chronic SDH with mild mass effect  - No evidence of focal neurologic deficit    PLAN:    NEURO:  - Q1 neuro checks, HOB 30 degrees  - Repeat CTH 6 hours from original, ~ 22:00, STAT with any change in mental status  - Keppra 500 Q12  - Tertiary trauma exam pending  - Tentatively planned for MMA embolization in AM with Dr. Reid and possible twist drill SDH evacuation post embolization  - Pain control: Tylenol 650mg PRN, Avoid Over sedation    PULM: RA  - Incentive Spirometry as tolerated    CV:  - SBP goal: 100-160  - PRN: Labetalol, Hydralazine    RENAL:  - Fluids: NS @ 50 while NPO  - Monitor Electrolytes and replete as needed    GI:  - Diet: NPO except meds  - GI prophylaxis [x] not indicated [] PPI [] other:  - Bowel regimen [] colace [x] senna [] other: MiraLAX    ENDO:   - Goal euglycemia (-180)  - ISS while NPO    HEME/ONC:  - VTE prophylaxis: [] SCDs [] chemoprophylaxis     ID: Afebrile  - Monitor Fever Curve    CODE STATUS:  [x] Full Code [] DNR [] DNI [] Palliative/Comfort Care    DISPOSITION:  [x] ICU [] Stroke Unit [] Floor [] EMU [] RCU [] PCU    [x] Patient is at high risk of neurologic deterioration/death due to: Herniation    Time spent: 60 critical care minutes

## 2025-05-30 NOTE — PATIENT PROFILE ADULT - NSPROEXTENSIONSOFSELF_GEN_A_NUR
I have examined the patient, reviewed the pertinent diagnostic studies and medical data, and have participated in the development of the treatment plan with the Nurse Practitioner. I have made appropriate addendums and agree with the documentation stated below. I spent more than 50% of the care time, history, physical exam and all (100%) of medical decision making dedicated to this patient seeing and evaluating the patient, reviewing labs and imaging and formulating the plan.     Cr Gill DO  Infectious Disease   Pager: [phone]    INFECTIOUS DISEASE PROGRESS NOTE    Patient: Ct Kelly Date: 2024   : 1982 Attending: Elmer Quinn MD   42 year old female Hospital Day: 8     Chief Complaint/reason for transfer of care: Bacteremia/ line infection     Subjective/Interval History:   - feeling good  - wants to go home  - tolerating cefazolin without any GI side effects      Reviewed Pertinent: Allergies, Medications, Radiology, and Labs    Medications: Reviewed     Vitals: Reviewed     Physical Exam:  -GENERAL: resting comfortably in bed in no acute distress  -HEENT  -NECK: supple, trachea midline   -LUNGS: good inspiratory effort, lungs CTAB  -HEART: RRR, no murmurs appreciated   -ABDOMINAL: Soft, non-tender, non-distended  -NEURO: no focal deficits, A&O x4  -EXTREMITIES: no edema  -PSYCH: linear, logical   -SKIN: warm, dry, no wounds   -Lines: right upper extremity picc, 2 L PIVs    Laboratory Results: Reviewed     Imaging: Reviewed     Microbiology: Reviewed     Assessment:   Sepsis due to MSSA Bacteremia with left SVC clot  Source: CVL; removed , PICC placed   Blood cultures positive: 8/15, cath tip +   Cleared blood cultures: , , ,   TTE : echogenic structure in proxima superior vena cava extending to left brachiocephalic vein  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 Will need to place left midline if possible for her IV abx   Okay to place ram week of 9/26. Would like PICC removed and ram placed while on IV Abx     INFECTIOUS DISEASE ANTIBIOTIC PLAN:   Prior to discharge needs left midline placed.   Plan as below:    Antimicrobials:    Today through 9/5: Cefazolin 2g IV TID   Start 9/6-9/13: keflex 500mg QID and doxycyline 100mg PO BID    Total length of treatment: 4 weeks from negative culture     Stop date: cefazolin: 9/5    Keflex and doxy 9/13    Access: left midline (needs to be placed)   Removal plan: upon completion of therapy   Weekly labs: CMP, CBC, CRP  ID provider: Dr. Gill  Outpatient follow-up: office will call   Office phone #[phone]      Discussed with: Nurse and Patient, Dr. Gill, CM, Dr. Kai CLEMENS  Infectious Diseases   cane

## 2025-05-30 NOTE — CONSULT NOTE ADULT - SUBJECTIVE AND OBJECTIVE BOX
SURGERY CONSULT  ==============================================================================================================  HISTORY OF PRESENT ILLNESS:   79yM PMH HLD, HTN, prostate CA, seasonal allergies, BPH, cardiac catheterization many years ago s/p 2 stents originally on Plavix but then stopped 2/2 ulcerative colitis and never went on ASA, h/o prostate CA, lung CA, melanoma s/p resection, remote history of afib after pulmonary testing several years ago originally on Eliquis but it flared up his ulcerative colitis so came off of it and hasn't heard about arrhythmia since then, squamous cell CA s/p radiation, h/o ACDF 1996, h/o anterior approach lumbar fusion in [place], [place] 1 year ago 5/28/24, now presents to ED s/p fall Saturday 5/24 without LOC, since fall has had difficulty walking and headaches unrelieved (at worst 5/10, currently 3/10) despite taking Tylenol/Advil. Attests to mild RLQ abdominal pain since he has for several months now. Denies dizziness, nausea, vomiting, chest pain, palpitations, SOB, numbness, tingling, new weakness.    Consult HPI:  HPI as noted above. Patient presented to ED accompanied by his wife after a fall 6 days ago off of a curb. Patient states that he has some difficulty walking after recent back surgery but walks unassisted. He states that he felt in his usual states of health prior to the incidient. He was able to walk and drive home thereafter however, he felt "sore" with constant headache and weakness in the days after. Denies nausea or emesis. CTH significant for large SDH. Patient not maintained on anticoagulation. No other traumatic injuries identified. Complaining of right hip pain at time of trauma evaluation.     PAST MEDICAL & SURGICAL HISTORY:  Hyperlipidemia  Hypertension  Prostate CA  Seasonal allergies  BPH (benign prostatic hyperplasia)  Ulcerative colitis  H/O cardiac catheterization s/p 2 stents, originally was on Plavix then removed after developing ulcerative colitis  Cervical vertebral fusion surgery 1996  H/O hernia repair left and right inguinal in 2019  and umbilical repair  Status post correction of deviated nasal septum 1970s  Anterior approached lumbar fusion 5/28/24 in [place], [place] by Dr. Hunter  Lung cancer s/p nodule resection  Melanoma s/p resection  Squamous cell cancer s/p radiation    FAMILY HISTORY:  FH: prostate cancer (Sibling)    SOCIAL HISTORY:  Tobacco Use: Past smoker, smoked teenager until 40 y/o at 3PPD  EtOH use: Drinks a glass of wine daily with dinner  Substance: Denies    Allergies  Levaquin (Swelling)  Eliquis (exacerbates ulcerative colitis)    REVIEW OF SYSTEMS  As noted in HPI    HOME MEDICATIONS:  Home Medications:  Cialis 10 mg oral tablet: 1 tab(s) orally once a day PM  Singulair 10 mg oral tablet: 1 tab(s) orally once a day  Skyrizi infusions    MEDICATIONS:  Antibiotics:    Neuro:    Anticoagulation:    OTHER:    IVF:    Vital Signs Last 24 Hrs  T(C): 36.7 (30 May 2025 11:56), Max: 36.7 (30 May 2025 11:56)  T(F): 98 (30 May 2025 11:56), Max: 98 (30 May 2025 11:56)  HR: 71 (30 May 2025 11:56) (71 - 80)  BP: 131/67 (30 May 2025 11:56) (112/66 - 131/67)  BP(mean): --  RR: 20 (30 May 2025 11:56) (20 - 20)  SpO2: 98% (30 May 2025 11:56) (98% - 98%)    Parameters below as of 30 May 2025 11:56  Patient On (Oxygen Delivery Method): room air    PHYSICAL EXAM:  GENERAL: NAD, well-groomed  HEAD: Atraumatic, normocephalic  COURTNEY COMA SCORE: E- 4 V- 5 M- 6=15  MENTAL STATUS: AAO x3; Appropriately conversant without aphasia; following commands  CRANIAL NERVES: PERRL. EOMI without nystagmus. Facial sensation intact V1-3 distribution b/l. Face symmetric w/ normal eye closure and smile, tongue midline. Hearing grossly intact. Speech clear  MOTOR: strength 5/5 upper extremities  SENSATION: grossly intact to light touch all extremities  CHEST/LUNG: Nonlabored  HEART: regular rate  ABDOMEN: mild RLQ/flank pain    LABS:      RADIOLOGY & ADDITIONAL STUDIES:  CT Cervical Spine No Cont (05.30.25 @ 16:08)  IMPRESSION:  HEAD CT:  Moderate to large acute on subacute subdural hematoma right   cerebral convexity with associated mass effect..  CERVICAL SPINE CT:  No evidence of an acute cervical spine fracture.   (30 May 2025 18:23)      PAST MEDICAL & SURGICAL HISTORY:  Hyperlipidemia      Hypertension      Prostate CA      Seasonal allergies      BPH (benign prostatic hyperplasia)      Ulcerative colitis      H/O cardiac catheterization  at [place] no stents as per patient      Cervical vertebral fusion  surgery 1996      H/O hernia repair  left and right inguinal in 2019  and umbilical repair      Status post correction of deviated nasal septum  1970s        Home Meds: Home Medications:  Asacol 400 mg oral delayed release tablet: 2  orally 3 times a day (11 May 2022 06:47)  Cialis 10 mg oral tablet: 1 tab(s) orally once a day PM (11 May 2022 06:47)  Crestor 10 mg oral tablet: 1 tab(s) orally once a day (11 May 2022 06:47)  Norvasc 5 mg oral tablet: 1 tab(s) orally once a day (11 May 2022 06:47)  Singulair 10 mg oral tablet: 1 tab(s) orally once a day (11 May 2022 06:47)  Tylenol 325 mg oral tablet: 2 tab(s) orally every 4 hours, As Needed (11 May 2022 06:47)  Visine 0.05% ophthalmic solution: 2 drop(s) to each affected eye once a day (11 May 2022 06:47)    Allergies: Allergies    Levaquin (Swelling)    Intolerances      Soc:   Advanced Directives: Presumed Full Code     CURRENT MEDICATIONS:   --------------------------------------------------------------------------------------  Neurologic Medications  acetaminophen     Tablet .. 650 milliGRAM(s) Oral every 6 hours PRN Temp greater or equal to 38C (100.4F), Mild Pain (1 - 3)  levETIRAcetam   Injectable 500 milliGRAM(s) IV Push every 12 hours    Respiratory Medications    Cardiovascular Medications  hydrALAZINE Injectable 10 milliGRAM(s) IV Push every 2 hours PRN SBP > 160  labetalol Injectable 10 milliGRAM(s) IV Push every 2 hours PRN SBP > 160    Gastrointestinal Medications  sodium chloride 0.9%. 1000 milliLiter(s) IV Continuous <Continuous>    Genitourinary Medications    Hematologic/Oncologic Medications    Antimicrobial/Immunologic Medications    Endocrine/Metabolic Medications    Topical/Other Medications  chlorhexidine 2% Cloths 1 Application(s) Topical daily    --------------------------------------------------------------------------------------    VITAL SIGNS, INS/OUTS (last 24 hours):  --------------------------------------------------------------------------------------  ICU Vital Signs Last 24 Hrs  T(C): 36.7 (30 May 2025 11:56), Max: 36.7 (30 May 2025 11:56)  T(F): 98 (30 May 2025 11:56), Max: 98 (30 May 2025 11:56)  HR: 72 (30 May 2025 17:15) (71 - 80)  BP: 156/68 (30 May 2025 17:15) (112/66 - 156/68)  BP(mean): 94 (30 May 2025 17:15) (94 - 94)  ABP: --  ABP(mean): --  RR: 20 (30 May 2025 17:15) (18 - 20)  SpO2: 99% (30 May 2025 17:15) (98% - 100%)    O2 Parameters below as of 30 May 2025 17:15  Patient On (Oxygen Delivery Method): room air          I&O's Summary    --------------------------------------------------------------------------------------    EXAM:  Constitutional: patient appears comfortable resting in bed, in no apparent distress  HEENT: head normocephalic and atraumatic, no blood in nares or oral cavity  Respiratory: respirations are unlabored, no accessory muscle use, no conversational dyspnea  Cardiovascular: regular rate & rhythm  Gastrointestinal: abdomen is soft & non-distended, non-tender, no rebound tenderness / guarding  Neurological: GCS15, A&O x 3  Musculoskeletal: ARTHUR x 4 spontaneously, extremities are without point tenderness or deformity  Skin: mucous membranes moist, no diaphoresis, pallor, cyanosis or jaundice  LABS  --------------------------------------------------------------------------------------  Labs:  CAPILLARY BLOOD GLUCOSE                              12.6   3.11  )-----------( 171      ( 30 May 2025 18:01 )             39.3       Auto Neutrophil %: 53.1 % (05-30-25 @ 18:01)  Auto Immature Granulocyte %: 0.3 % (05-30-25 @ 18:01)    05-30    138  |  102  |  22.0[H]  ----------------------------<  86  4.3   |  21.0[L]  |  0.97      Calcium: 8.6 mg/dL (05-30-25 @ 18:01)      LFTs:             6.2  | 0.5  | 18       ------------------[74      ( 30 May 2025 18:01 )  3.7  | x    | 13          Lipase:x      Amylase:x             Coags:     11.8   ----< 1.04    ( 30 May 2025 18:01 )     32.9                Urinalysis Basic - ( 30 May 2025 18:01 )    Color: x / Appearance: x / SG: x / pH: x  Gluc: 86 mg/dL / Ketone: x  / Bili: x / Urobili: x   Blood: x / Protein: x / Nitrite: x   Leuk Esterase: x / RBC: x / WBC x   Sq Epi: x / Non Sq Epi: x / Bacteria: x          --------------------------------------------------------------------------------------

## 2025-05-30 NOTE — ED ADULT NURSE NOTE - OBJECTIVE STATEMENT
Pt c/o fall last saturday and now has headaches, dizziness,  nausea, and lower back pain. States he tripped on the curb and fell onto concrete. +head strike, -loc, - thinners. Ambulates with walker/cane at baseline,

## 2025-05-30 NOTE — ED ADULT NURSE NOTE - NSFALLRISKINTERV_ED_ALL_ED

## 2025-05-30 NOTE — CONSULT NOTE ADULT - ATTENDING COMMENTS
78yo male with CAD s/p PCI on ASA s/p fall 6 days ago presented in ED with difficult ambulation and intermittent AMS. During work-up found SDH. Trauma was consulted due to history of fall. On exam, hemodynamically stable, GCS 15, mild pelvic pain. All image reviewed, no pelvic fx. No further trauma work-up needed. Other care per neurosurgery and neuro ICU.

## 2025-05-30 NOTE — PATIENT PROFILE ADULT - NSPROIMPLANTSMEDDEV_GEN_A_NUR
2 LDA Stents, 95 Prostate seeds, metal titanium plated in neck, 6 large screws in L4, L5, S1, in lower back, 2 lenses in eyes

## 2025-05-30 NOTE — ED PROVIDER NOTE - PROGRESS NOTE DETAILS
Malena Whitfield MD, Attending  On CT, pt has large subdural hemorrhage. Pt endorse headache, no other neurological deficits at this time. NSGY consulted, will see pt, pt and wife aware of CT findings.   Pt sigend out to PM attending pending nsgy recommendations for disposition. Malena Whitfield MD, Attending  trauma consulted. will see pt

## 2025-05-30 NOTE — ED ADULT NURSE REASSESSMENT NOTE - NS ED NURSE REASSESS COMMENT FT1
Pt moved to critical care. Report given to Ashanti and Georgina RNs. Pt stable, offers no complaints at this time. AA&Ox4 speaking in full sentences.
called Neuro PA made aware of pt failing dysphagia,  pt c/o headache and to change PO meds to IV,
rcvd pt from RN HE, pt A&OX4, states he tripped and fell 6 days ago an d hit head on a curb and since has been having right sided headache, worsening unsteady gait and nausea, Neuro PA at bedside for eval and explaining plan of care, cardiac monitor in place showing NSR, resp even and unlabored no distress noted, bed in lowest position and side rails up

## 2025-05-30 NOTE — CONSULT NOTE ADULT - SUBJECTIVE AND OBJECTIVE BOX
HISTORY OF PRESENT ILLNESS:   79yM PMH HLD, HTN, prostate CA, seasonal allergies, BPH, cardiac catheterization many years ago s/p 2 stents originally on Plavix but then stopped 2/2 ulcerative colitis and never went on ASA, h/o prostate CA, lung CA, melanoma s/p resection, remote history of afib after pulmonary testing several years ago originally on Eliquis but it flared up his ulcerative colitis so came off of it and hasn't heard about arrhythmia since then, squamous cell CA s/p radiation, h/o ACDF 1996, h/o anterior approach lumbar fusion in [place], [place] 1 year ago 5/28/24, now presents to ED s/p fall Saturday 5/24 without LOC, since fall has had difficulty walking and headaches unrelieved (at worst 5/10, currently 3/10) despite taking Tylenol/Advil. Attests to mild RLQ abdominal pain since he has for several months now. Denies dizziness, nausea, vomiting, chest pain, palpitations, SOB, numbness, tingling, new weakness.    PAST MEDICAL & SURGICAL HISTORY:  Hyperlipidemia  Hypertension  Prostate CA  Seasonal allergies  BPH (benign prostatic hyperplasia)  Ulcerative colitis  H/O cardiac catheterization s/p 2 stents, originally was on Plavix then removed after developing ulcerative colitis  Cervical vertebral fusion surgery 1996  H/O hernia repair left and right inguinal in 2019  and umbilical repair  Status post correction of deviated nasal septum 1970s  Anterior approached lumbar fusion 5/28/24 in [place], [place] by Dr. Hunter  Lung cancer s/p nodule resection  Melanoma s/p resection  Squamous cell cancer s/p radiation    FAMILY HISTORY:  FH: prostate cancer (Sibling)    SOCIAL HISTORY:  Tobacco Use: Past smoker, smoked teenager until 40 y/o at 3PPD  EtOH use: Drinks a glass of wine daily with dinner  Substance: Denies    Allergies  Levaquin (Swelling)  Eliquis (exacerbates ulcerative colitis)    REVIEW OF SYSTEMS  As noted in HPI    HOME MEDICATIONS:  Home Medications:  Cialis 10 mg oral tablet: 1 tab(s) orally once a day PM  Singulair 10 mg oral tablet: 1 tab(s) orally once a day  Skyrizi infusions    MEDICATIONS:  Antibiotics:    Neuro:    Anticoagulation:    OTHER:    IVF:    Vital Signs Last 24 Hrs  T(C): 36.7 (30 May 2025 11:56), Max: 36.7 (30 May 2025 11:56)  T(F): 98 (30 May 2025 11:56), Max: 98 (30 May 2025 11:56)  HR: 71 (30 May 2025 11:56) (71 - 80)  BP: 131/67 (30 May 2025 11:56) (112/66 - 131/67)  BP(mean): --  RR: 20 (30 May 2025 11:56) (20 - 20)  SpO2: 98% (30 May 2025 11:56) (98% - 98%)    Parameters below as of 30 May 2025 11:56  Patient On (Oxygen Delivery Method): room air    PHYSICAL EXAM:  GENERAL: NAD, well-groomed  HEAD: Atraumatic, normocephalic  COURTNEY COMA SCORE: E- 4 V- 5 M- 6=15  MENTAL STATUS: AAO x3; Appropriately conversant without aphasia; following commands  CRANIAL NERVES: PERRL. EOMI without nystagmus. Facial sensation intact V1-3 distribution b/l. Face symmetric w/ normal eye closure and smile, tongue midline. Hearing grossly intact. Speech clear  MOTOR: strength 5/5 upper extremities  SENSATION: grossly intact to light touch all extremities  CHEST/LUNG: Nonlabored  HEART: regular rate  ABDOMEN: mild RLQ/flank pain    LABS:  Pending    RADIOLOGY & ADDITIONAL STUDIES:  CT Cervical Spine No Cont (05.30.25 @ 16:08)  IMPRESSION:  HEAD CT:  Moderate to large acute on subacute subdural hematoma right   cerebral convexity with associated mass effect..  CERVICAL SPINE CT:  No evidence of an acute cervical spine fracture.

## 2025-05-30 NOTE — ED PROVIDER NOTE - CLINICAL SUMMARY MEDICAL DECISION MAKING FREE TEXT BOX
---------  Malena Whitfield MD, Attending  79-year-old male past medical history of ulcerative colitis, prostate cancer, hypertension, hyperlipidemia, multiple fusions in the cervical spine pw head, neck and right low back pain sp mechanical fall 6 days ago. Pt was walking into the gym when he tripped in the doorway. Since then, pt has had persistent right sided headache, neck pain, and right low back pain slightly relieved by apap and Ibuprofen.   Gen: NAD  Head: NC/AT  Neck: trachea midline, right paraspinal C spine ttp  spine: no midline CTL spine ttp.   Resp:  No distress  card: RRR, S1, S2  abd: soft, non tender. non distended.   Ext: no deformities  Neuro:  A&O appears non focal  Skin:  Warm and dry as visualized  Psych:  Normal affect and mood  ddx includes, but is not limited to the following: acute fracture, hardware, displacement, ich.   plan: CTs, analgesia, reassess

## 2025-05-30 NOTE — H&P ADULT - HISTORY OF PRESENT ILLNESS
HISTORY OF PRESENT ILLNESS:   79yM PMH HLD, HTN, prostate CA, seasonal allergies, BPH, cardiac catheterization many years ago s/p 2 stents originally on Plavix but then stopped 2/2 ulcerative colitis and never went on ASA, h/o prostate CA, lung CA, melanoma s/p resection, remote history of afib after pulmonary testing several years ago originally on Eliquis but it flared up his ulcerative colitis so came off of it and hasn't heard about arrhythmia since then, squamous cell CA s/p radiation, h/o ACDF 1996, h/o anterior approach lumbar fusion in [place], [place] 1 year ago 5/28/24, now presents to ED s/p fall Saturday 5/24 without LOC, since fall has had difficulty walking and headaches unrelieved (at worst 5/10, currently 3/10) despite taking Tylenol/Advil. Attests to mild RLQ abdominal pain since he has for several months now. Denies dizziness, nausea, vomiting, chest pain, palpitations, SOB, numbness, tingling, new weakness.    PAST MEDICAL & SURGICAL HISTORY:  Hyperlipidemia  Hypertension  Prostate CA  Seasonal allergies  BPH (benign prostatic hyperplasia)  Ulcerative colitis  H/O cardiac catheterization s/p 2 stents, originally was on Plavix then removed after developing ulcerative colitis  Cervical vertebral fusion surgery 1996  H/O hernia repair left and right inguinal in 2019  and umbilical repair  Status post correction of deviated nasal septum 1970s  Anterior approached lumbar fusion 5/28/24 in [place], [place] by Dr. Hunter  Lung cancer s/p nodule resection  Melanoma s/p resection  Squamous cell cancer s/p radiation    FAMILY HISTORY:  FH: prostate cancer (Sibling)    SOCIAL HISTORY:  Tobacco Use: Past smoker, smoked teenager until 40 y/o at 3PPD  EtOH use: Drinks a glass of wine daily with dinner  Substance: Denies    Allergies  Levaquin (Swelling)  Eliquis (exacerbates ulcerative colitis)    REVIEW OF SYSTEMS  As noted in HPI    HOME MEDICATIONS:  Home Medications:  Cialis 10 mg oral tablet: 1 tab(s) orally once a day PM  Singulair 10 mg oral tablet: 1 tab(s) orally once a day  Skyrizi infusions    MEDICATIONS:  Antibiotics:    Neuro:    Anticoagulation:    OTHER:    IVF:    Vital Signs Last 24 Hrs  T(C): 36.7 (30 May 2025 11:56), Max: 36.7 (30 May 2025 11:56)  T(F): 98 (30 May 2025 11:56), Max: 98 (30 May 2025 11:56)  HR: 71 (30 May 2025 11:56) (71 - 80)  BP: 131/67 (30 May 2025 11:56) (112/66 - 131/67)  BP(mean): --  RR: 20 (30 May 2025 11:56) (20 - 20)  SpO2: 98% (30 May 2025 11:56) (98% - 98%)    Parameters below as of 30 May 2025 11:56  Patient On (Oxygen Delivery Method): room air    PHYSICAL EXAM:  GENERAL: NAD, well-groomed  HEAD: Atraumatic, normocephalic  COURTNEY COMA SCORE: E- 4 V- 5 M- 6=15  MENTAL STATUS: AAO x3; Appropriately conversant without aphasia; following commands  CRANIAL NERVES: PERRL. EOMI without nystagmus. Facial sensation intact V1-3 distribution b/l. Face symmetric w/ normal eye closure and smile, tongue midline. Hearing grossly intact. Speech clear  MOTOR: strength 5/5 upper extremities  SENSATION: grossly intact to light touch all extremities  CHEST/LUNG: Nonlabored  HEART: regular rate  ABDOMEN: mild RLQ/flank pain    LABS:      RADIOLOGY & ADDITIONAL STUDIES:  CT Cervical Spine No Cont (05.30.25 @ 16:08)  IMPRESSION:  HEAD CT:  Moderate to large acute on subacute subdural hematoma right   cerebral convexity with associated mass effect..  CERVICAL SPINE CT:  No evidence of an acute cervical spine fracture.

## 2025-05-30 NOTE — ED ADULT TRIAGE NOTE - CHIEF COMPLAINT QUOTE
Patient presents to ED C/O headache, back and right hip pain, reports fall on Saturday with head strike, no LOC, no blood thinners, ambulated with cane at home, resp even/unlabored.

## 2025-05-30 NOTE — PATIENT PROFILE ADULT - DO YOU EVER NEED HELP READING HOSPITAL MATERIALS?
immune globulin (intravenous and subcutaneous)  Pronunciation:  dinora LACEY foley  Brand:  Gammagard Liquid, Gammaked, Gamunex-C  What is the most important information I should know about immune globulin? This medicine can cause blood clots. The risk is highest in older adults or in people who have had blood clots, heart problems, or blood circulation problems. Blood clots are also more likely during long-term bedrest, while using birth control pills or hormone replacement therapy, or while having a central intravenous (IV) catheter in place. Call your doctor at once if you have chest pain, trouble breathing, fast heartbeats, numbness or weakness, or swelling and warmth or discoloration in an arm or leg. This medicine can also harm your kidneys, especially if you have kidney disease or if you also use certain medicines. Tell your doctor right away if you have signs of kidney problems, such as swelling, rapid weight gain, and little or no urination. What is immune globulin? Immune globulin intravenous and subcutaneous (for injection into a vein or under the skin) is used to treat primary immunodeficiency. Immune globulin is also used to increase platelets (blood clotting cells) in people with idiopathic thrombocytopenic purpura. Immune globulin is also used to treat certain debilitating nerve disorders that cause muscle weakness and can affect daily activities. Immune globulin may also be used for purposes not listed in this medication guide. What should I discuss with my healthcare provider before using immune globulin? You should not use this medicine if:  you have had an allergic reaction to an immune globulin or blood product; or  you have immune globulin A (IgA) deficiency with antibody to IgA. Immune globulin can cause blood clots or kidney problems, especially in older adults or in people with certain conditions.  Tell your doctor if you have ever had:  heart problems, blood circulation problems, or \"thick blood\";  a stroke or blood clot;  kidney disease;  diabetes;  an infection called sepsis;  if you use estrogens (birth control pills or hormone replacement therapy);  if you have been on long-term bedrest; or  if you have a central intravenous (IV) catheter in place. You may need a dose adjustment if you are exposed to measles, or if you travel to an area where this disease is common. Tell your doctor if you are pregnant or breastfeeding. Immune globulin is made from donated human plasma and may contain viruses or other infectious agents. Donated plasma is tested and treated to reduce the risk of contamination, but there is still a small possibility it could transmit disease. Ask your doctor about any possible risk. How should I use immune globulin? Follow all directions on your prescription label and read all medication guides or instruction sheets. Your doctor may occasionally change your dose. Use the medicine exactly as directed. Immune globulin is given as an infusion into a vein, or injected under the skin using an infusion pump. A healthcare provider will give your first dose and may teach you how to properly use the medication by yourself. Do not inject immune globulin into a vein if you have been instructed to give the medicine as a subcutaneous injection (under the skin). How you give this medication, how often you use it, and the length of your infusion time will depend on the condition being treated. Read and carefully follow any Instructions for Use provided with your medicine. Ask your doctor or pharmacist if you don't understand all instructions. Prepare an injection only when you are ready to give it. Do not use if the medicine looks cloudy, has changed colors, or has particles in it. Call your pharmacist for new medicine. Do not shake the medication bottle or you may ruin the medicine. Immune globulin must be given slowly.  You may need to use several catheters to inject this medicine into different body areas at the same time. Your healthcare provider will show you the best places on your body to inject the medication. Keep a diary of the days and times you gave the injection and where you injected it on your body. Drink plenty of liquids  while you are using this medicine to help improve your blood flow and keep your kidneys working properly. You may need frequent blood or urine tests. This medicine can affect the results of certain medical tests. Tell any doctor who treats you that you are using immune globulin. Store this medicine in its original carton in the refrigerator. Do not freeze immune globulin, and throw the medicine away if it has frozen. Take the medicine out of the refrigerator and let it reach room temperature for up to 1 hour before injecting your dose. You may also store immune globulin at room temperature. You will need to use immune globulin within a certain number months. This will depend on the how you store the medicine (at room temperature, or in a refrigerator). Carefully follow the storage instructions provided with your medicine. Do not use the medicine after the expiration date on the label has passed. Each vial (bottle) is for one use only. Throw it away after one use, even if there is still medicine left inside. Use disposable injection items (needle, catheter, tubing) only once and then place them in a puncture-proof \"sharps\" container. Follow state or local laws about how to dispose of this container. Keep it out of the reach of children and pets. What happens if I miss a dose? Call your doctor for instructions if you miss a dose. What happens if I overdose? Seek emergency medical attention or call the Poison Help line at 1-444.980.8383. What should I avoid while using immune globulin? Do not receive a \"live\" vaccine while using immune globulin. The vaccine may not work as well and may not fully protect you from disease.  Live vaccines include measles, mumps, rubella (MMR), rotavirus, typhoid, yellow fever, varicella (chickenpox), zoster (shingles), and nasal flu (influenza) vaccine. What are the possible side effects of immune globulin? Get emergency medical help if you have signs of an allergic reaction: hives; wheezing, difficulty breathing; dizziness, feeling like you might pass out; swelling of your face, lips, tongue, or throat. Some side effects may occur during the injection. Tell your caregiver if you feel light-headed, itchy, chilled, sweaty, or have chest discomfort, fast heartbeats, severe headache, or pounding in your neck or ears. Call your doctor at once if you have:  a blood cell disorder --pale or yellowed skin, dark colored urine, fever, confusion or weakness;  dehydration symptoms --feeling very thirsty or hot, being unable to urinate, heavy sweating, or hot and dry skin;  kidney problems --little or no urination, swelling, rapid weight gain, feeling short of breath;  lung problems --chest pain, wheezing, trouble breathing, blue colored lips, fingers, or toes;  signs of a new infection --fever with a severe headache, neck stiffness, eye pain, and increased sensitivity to light; or  signs of a blood clot --shortness of breath, chest pain with deep breathing, rapid heart rate, numbness or weakness on one side of the body, swelling and warmth or discoloration in an arm or leg. Common side effects may include:  runny or stuffy nose, sinus pain, cough, sore throat;  fever, chills, weakness;  headache, back pain, muscle or joint pain;  dizziness, tiredness, depressed mood;  swelling in your hands or feet;  skin rash, redness, or bruising;  blisters or ulcers in your mouth, red or swollen gums, trouble swallowing;  nausea, diarrhea, stomach pain, upset stomach;  increased blood pressure; or  redness, swelling, or itching where an injection was given. This is not a complete list of side effects and others may occur.  Call your doctor for medical advice about side effects. You may report side effects to FDA at 6-163-WVG-8898. What other drugs will affect immune globulin? Immune globulin can harm your kidneys, especially if you also use certain medicines for infections, cancer, osteoporosis, organ transplant rejection, bowel disorders, high blood pressure, or pain or arthritis (including Advil, Motrin, and Aleve). Other drugs may affect immune globulin, including prescription and over-the-counter medicines, vitamins, and herbal products. Tell your doctor about all your current medicines and any medicine you start or stop using. Where can I get more information? Your doctor or pharmacist can provide more information about immune globulin. Remember, keep this and all other medicines out of the reach of children, never share your medicines with others, and use this medication only for the indication prescribed. Every effort has been made to ensure that the information provided by [facility] [address] is accurate, up-to-date, and complete, but no guarantee is made to that effect. Drug information contained herein may be time sensitive. [employer] information has been compiled for use by healthcare practitioners and consumers in the [place] and therefore [employer] does not warrant that uses outside of the [place] are appropriate, unless specifically indicated otherwise. [employer]'s drug information does not endorse drugs, diagnose patients or recommend therapy. [employer] drug information is an informational resource designed to assist licensed healthcare practitioners in caring for their patients and/or to serve consumers viewing this service as a supplement to, and not a substitute for, the expertise, skill, knowledge and judgment of healthcare practitioners.  The absence of a warning for a given drug or drug combination in no way should be construed to indicate that the drug or drug combination is safe, effective or appropriate for any given patient. [facility] does not assume any responsibility for any aspect of healthcare administered with the aid of information [facility] provides. The information contained herein is not intended to cover all possible uses, directions, precautions, warnings, drug interactions, allergic reactions, or adverse effects. If you have questions about the drugs you are taking, check with your doctor, nurse or pharmacist.  Copyright 3275-3929 [address] [place] [address] Dr SEVERINO. Version: 4.02. Revision date: 2/26/2021. Care instructions adapted under license by [facility] [address] If you have questions about a medical condition or this instruction, always ask your healthcare professional. [place] [address] any warranty or liability for your use of this information. no

## 2025-05-31 ENCOUNTER — TRANSCRIPTION ENCOUNTER (OUTPATIENT)
Age: 80
End: 2025-05-31

## 2025-05-31 LAB
ANION GAP SERPL CALC-SCNC: 10 MMOL/L — SIGNIFICANT CHANGE UP (ref 5–17)
BUN SERPL-MCNC: 19.7 MG/DL — SIGNIFICANT CHANGE UP (ref 8–20)
CALCIUM SERPL-MCNC: 8.6 MG/DL — SIGNIFICANT CHANGE UP (ref 8.4–10.5)
CHLORIDE SERPL-SCNC: 103 MMOL/L — SIGNIFICANT CHANGE UP (ref 96–108)
CO2 SERPL-SCNC: 23 MMOL/L — SIGNIFICANT CHANGE UP (ref 22–29)
CREAT SERPL-MCNC: 0.93 MG/DL — SIGNIFICANT CHANGE UP (ref 0.5–1.3)
EGFR: 84 ML/MIN/1.73M2 — SIGNIFICANT CHANGE UP
EGFR: 84 ML/MIN/1.73M2 — SIGNIFICANT CHANGE UP
GLUCOSE SERPL-MCNC: 85 MG/DL — SIGNIFICANT CHANGE UP (ref 70–99)
HCT VFR BLD CALC: 38.2 % — LOW (ref 39–50)
HGB BLD-MCNC: 12.3 G/DL — LOW (ref 13–17)
MAGNESIUM SERPL-MCNC: 2.2 MG/DL — SIGNIFICANT CHANGE UP (ref 1.6–2.6)
MCHC RBC-ENTMCNC: 29.6 PG — SIGNIFICANT CHANGE UP (ref 27–34)
MCHC RBC-ENTMCNC: 32.2 G/DL — SIGNIFICANT CHANGE UP (ref 32–36)
MCV RBC AUTO: 92 FL — SIGNIFICANT CHANGE UP (ref 80–100)
MRSA PCR RESULT.: SIGNIFICANT CHANGE UP
NRBC # BLD AUTO: 0 K/UL — SIGNIFICANT CHANGE UP (ref 0–0)
NRBC # FLD: 0 K/UL — SIGNIFICANT CHANGE UP (ref 0–0)
NRBC BLD AUTO-RTO: 0 /100 WBCS — SIGNIFICANT CHANGE UP (ref 0–0)
PHOSPHATE SERPL-MCNC: 2.9 MG/DL — SIGNIFICANT CHANGE UP (ref 2.4–4.7)
PLATELET # BLD AUTO: 165 K/UL — SIGNIFICANT CHANGE UP (ref 150–400)
PMV BLD: 9.5 FL — SIGNIFICANT CHANGE UP (ref 7–13)
POTASSIUM SERPL-MCNC: 4.3 MMOL/L — SIGNIFICANT CHANGE UP (ref 3.5–5.3)
POTASSIUM SERPL-SCNC: 4.3 MMOL/L — SIGNIFICANT CHANGE UP (ref 3.5–5.3)
RBC # BLD: 4.15 M/UL — LOW (ref 4.2–5.8)
RBC # FLD: 13.1 % — SIGNIFICANT CHANGE UP (ref 10.3–14.5)
S AUREUS DNA NOSE QL NAA+PROBE: SIGNIFICANT CHANGE UP
SODIUM SERPL-SCNC: 136 MMOL/L — SIGNIFICANT CHANGE UP (ref 135–145)
WBC # BLD: 3.13 K/UL — LOW (ref 3.8–10.5)
WBC # FLD AUTO: 3.13 K/UL — LOW (ref 3.8–10.5)

## 2025-05-31 PROCEDURE — 75898 FOLLOW-UP ANGIOGRAPHY: CPT | Mod: 26

## 2025-05-31 PROCEDURE — 36224 PLACE CATH CAROTD ART: CPT | Mod: RT

## 2025-05-31 PROCEDURE — 70450 CT HEAD/BRAIN W/O DYE: CPT | Mod: 26

## 2025-05-31 PROCEDURE — 70450 CT HEAD/BRAIN W/O DYE: CPT | Mod: 26,77

## 2025-05-31 PROCEDURE — 61624 TCAT PERM OCCLS/EMBOLJ CNS: CPT

## 2025-05-31 PROCEDURE — 36227 PLACE CATH XTRNL CAROTID: CPT | Mod: RT

## 2025-05-31 PROCEDURE — 75894 X-RAYS TRANSCATH THERAPY: CPT | Mod: 26

## 2025-05-31 PROCEDURE — 61108 TDH PNXR EVAC&/DRG SDRL HMTA: CPT

## 2025-05-31 PROCEDURE — 99291 CRITICAL CARE FIRST HOUR: CPT

## 2025-05-31 RX ORDER — TAMSULOSIN HYDROCHLORIDE 0.4 MG/1
0.4 CAPSULE ORAL AT BEDTIME
Refills: 0 | Status: DISCONTINUED | OUTPATIENT
Start: 2025-05-31 | End: 2025-06-04

## 2025-05-31 RX ORDER — MONTELUKAST SODIUM 10 MG/1
10 TABLET ORAL DAILY
Refills: 0 | Status: DISCONTINUED | OUTPATIENT
Start: 2025-05-31 | End: 2025-06-04

## 2025-05-31 RX ORDER — TRAMADOL HYDROCHLORIDE 50 MG/1
50 TABLET, FILM COATED ORAL EVERY 6 HOURS
Refills: 0 | Status: DISCONTINUED | OUTPATIENT
Start: 2025-05-31 | End: 2025-06-01

## 2025-05-31 RX ORDER — TRAMADOL HYDROCHLORIDE 50 MG/1
25 TABLET, FILM COATED ORAL EVERY 6 HOURS
Refills: 0 | Status: DISCONTINUED | OUTPATIENT
Start: 2025-05-31 | End: 2025-06-04

## 2025-05-31 RX ORDER — ACETAMINOPHEN 500 MG/5ML
1000 LIQUID (ML) ORAL ONCE
Refills: 0 | Status: COMPLETED | OUTPATIENT
Start: 2025-05-31 | End: 2025-05-31

## 2025-05-31 RX ORDER — ACETAMINOPHEN 500 MG/5ML
650 LIQUID (ML) ORAL EVERY 6 HOURS
Refills: 0 | Status: DISCONTINUED | OUTPATIENT
Start: 2025-05-31 | End: 2025-06-04

## 2025-05-31 RX ORDER — ROSUVASTATIN CALCIUM 20 MG/1
10 TABLET, FILM COATED ORAL AT BEDTIME
Refills: 0 | Status: DISCONTINUED | OUTPATIENT
Start: 2025-05-31 | End: 2025-06-04

## 2025-05-31 RX ADMIN — Medication 10 MILLIGRAM(S): at 00:14

## 2025-05-31 RX ADMIN — LEVETIRACETAM 500 MILLIGRAM(S): 10 INJECTION, SOLUTION INTRAVENOUS at 17:57

## 2025-05-31 RX ADMIN — TAMSULOSIN HYDROCHLORIDE 0.4 MILLIGRAM(S): 0.4 CAPSULE ORAL at 21:34

## 2025-05-31 RX ADMIN — Medication 1000 MILLIGRAM(S): at 07:00

## 2025-05-31 RX ADMIN — MONTELUKAST SODIUM 10 MILLIGRAM(S): 10 TABLET ORAL at 21:34

## 2025-05-31 RX ADMIN — Medication 25 GRAM(S): at 00:14

## 2025-05-31 RX ADMIN — Medication 400 MILLIGRAM(S): at 06:31

## 2025-05-31 RX ADMIN — LEVETIRACETAM 500 MILLIGRAM(S): 10 INJECTION, SOLUTION INTRAVENOUS at 05:41

## 2025-05-31 RX ADMIN — Medication 500 MILLILITER(S): at 00:14

## 2025-05-31 RX ADMIN — Medication 650 MILLIGRAM(S): at 18:30

## 2025-05-31 RX ADMIN — Medication 400 MILLIGRAM(S): at 11:26

## 2025-05-31 RX ADMIN — Medication 650 MILLIGRAM(S): at 17:42

## 2025-05-31 RX ADMIN — Medication 1000 MILLIGRAM(S): at 12:00

## 2025-05-31 RX ADMIN — Medication 1 APPLICATION(S): at 11:41

## 2025-05-31 NOTE — CHART NOTE - NSCHARTNOTEFT_GEN_A_CORE
Neurointerventional Surgery Post Procedure Note    Procedure: Selective Cerebral Angiography with MMA embolization and percutaneous subdural drain placement    Pre- Procedure Diagnosis: R SDH  Post- Procedure Diagnosis: R SDH s/p R MMA embolization with flower    : Dr. Jean Reid MD  Physician Assistant: Laverne Alexandre PA-C  Nurse: Sammi Perez   Anesthesiologist: HALINA Perez   Radiology Tech: Lencho Dockery  Fellow: Justin Isidro    Sheath: 5 [place] Sheath    I/Os: estimated blood loss less than 10cc,  IV fluids cc, Urine output cc, Contrast: Omnipaque 240   cc, ABX 2g ancef    Vitals:  /76   HR 96  Spo2  100%    Preliminary Report:  Under a 5 [place] BMX 81 sheath via the right groin under general anesthesia via right internal carotid artery, right external carotid artery, a selective cerebral angiography  was performed and reveals R SDH s/p R MMA embolization with flower. ( Official note to follow).    Patient tolerated procedure well.  Patient remains hemodynamically stable, no change in neurological status compared to baseline.  Results were discussed with patient, patient's family and Neurosurgery.  Right groin sheath was discontinued using Vascade closure device at 1014. Hemostasis was obtained with approximately 11 minutes of manual compression.     No active bleeding, no hematoma, no ecchymosis.   Quick clot and safeguard balloon dressing applied at 1025.      Patient underwent a right percutaneous subdural drain placement under general anesthesia along with local anesthetic. Subdural drain connected to EVD setup and closed. Drain was placed at 1035 am.       Patient transferred to neuro ICU in stable condition. Neurointerventional Surgery Post Procedure Note    Procedure: Selective Cerebral Angiography with MMA embolization and percutaneous subdural drain placement    Pre- Procedure Diagnosis: R SDH  Post- Procedure Diagnosis: R SDH s/p R MMA embolization with flower    : Dr. Jean Reid MD  Physician Assistant: Laverne Alexandre PA-C  Nurse: Sammi Perez   Anesthesiologist: HALINA Perez   Radiology Tech: Lencho Dockery  Fellow: Justin Isidro    Sheath: 5 [place] Sheath    I/Os: estimated blood loss less than 20cc,  IV fluids 450cc, Urine output 0cc, Contrast: Omnipaque 240 45  cc, ABX 2g ancef    Vitals:  /76   HR 96  Spo2  100%    Preliminary Report:  Under a 5 [place] BMX 81 sheath via the right groin under general anesthesia via right internal carotid artery, right external carotid artery, a selective cerebral angiography  was performed and reveals R SDH s/p R MMA embolization with flower. ( Official note to follow).    Patient tolerated procedure well.  Patient remains hemodynamically stable, no change in neurological status compared to baseline.  Results were discussed with patient, patient's family and Neurosurgery.  Right groin sheath was discontinued using Vascade closure device at 1014. Hemostasis was obtained with approximately 11 minutes of manual compression.     No active bleeding, no hematoma, no ecchymosis.   Quick clot and safeguard balloon dressing applied at 1025.      Patient underwent a right percutaneous subdural drain placement under general anesthesia along with local anesthetic. Subdural drain connected to EVD setup and closed. Drain was placed at 1035 am.       Patient transferred to neuro ICU in stable condition.

## 2025-05-31 NOTE — DISCHARGE NOTE NURSING/CASE MANAGEMENT/SOCIAL WORK - FINANCIAL ASSISTANCE
[facility] provides services at a reduced cost to those who are determined to be eligible through [facility]’s financial assistance program. Information regarding [facility]’s financial assistance program can be found by going to [email or web address] or by calling [phone].

## 2025-05-31 NOTE — PROGRESS NOTE ADULT - ASSESSMENT
79yM  w/ extensive PMH including 2 cardiac stents several years ago originally on Plavix but then stopped 2/2 ulcerative colitis and never went on ASA, remote history of afib after pulmonary testing several years ago originally on Eliquis but it flared up his ulcerative colitis so came off of it and hasn't heard about arrhythmia since then, h/o anterior approach lumbar fusion in [place], [place] 1 year ago 5/28/24, s/p fall Saturday 5/24 without LOC p/w with worsening gait instabiity and headaches (recent advil use). CTH significant for 2.4 cm acute on chronic SDH with mild mass effect      PLAN:   - D/w Dr. Reid   - Q1 neuro checks, HOB 30 degrees. Admit to NSICU    - Repeat CTH 6 hours from original : stable  STAT with any change in mental status   - Keppra 500 Q12   - plan for  MMA embolization in AM with Dr. Reid and possible twist drill SDH evacuation post embolizaton   - Preop for procedures in AM- basic labs pending + CXR + EKG   - Normotensive BP goals, SBP < 160   - Hold ACT/APT   - hold chemical DVT prophylaxis at this time given acute on chronic SDH.

## 2025-05-31 NOTE — PROGRESS NOTE ADULT - ASSESSMENT
79yM with acute on chronic R SDh with mass effect/brain compression, now s/p MMA embolization and percutaneous subdural drain placement 05/31.  S/p mechanical fall 05/24 with head strike.  PMH including CAD s/p cardiac stents several years ago (off Plavix but then stopped 2/2 ulcerative colitis and never went on ASA), remote history of afib several years ago (off Eliquis due to Inflamm BD.    PLAN:  - cont frequent neurochecks  - stability CTh today; STAT if worsening neuro status  - drain per NSGy, monitor output  - cont Keppra for sz ppx  - pain control, avoid oversedation  - SBP goal: 100-160, PRN meds  - maintain Osats>92%, incentive spirometry  - NPO for now  - monitor e-lytes, UOP; IV fluids for now  - maintain -180, ISS  - VTE ppx with SCDs, hold anti-thrombotics with SDH

## 2025-05-31 NOTE — DISCHARGE NOTE NURSING/CASE MANAGEMENT/SOCIAL WORK - PATIENT PORTAL LINK FT
You can access the FollowMyHealth Patient Portal offered by [facility] by registering at the following website: [email or web address]. By joining [facility]’s FollowMyHealth portal, you will also be able to view your health information using other applications (apps) compatible with our system.

## 2025-05-31 NOTE — CHART NOTE - NSCHARTNOTEFT_GEN_A_CORE
Neurointerventional Surgery  Pre-Procedure Note     HPI:  79yM PMH HLD, HTN, prostate CA, seasonal allergies, BPH, cardiac catheterization many years ago s/p 2 stents originally on Plavix but then stopped 2/2 ulcerative colitis and never went on ASA, h/o prostate CA, lung CA, melanoma s/p resection, remote history of afib after pulmonary testing several years ago originally on Eliquis but it flared up his ulcerative colitis so came off of it and hasn't heard about arrhythmia since then, squamous cell CA s/p radiation, h/o ACDF 1996, h/o anterior approach lumbar fusion in [place], [place] 1 year ago 5/28/24, now presents to ED s/p fall Saturday 5/24 without LOC, since fall has had difficulty walking and headaches unrelieved (at worst 5/10, currently 3/10) despite taking Tylenol/Advil. Attests to mild RLQ abdominal pain since he has for several months now. Denies dizziness, nausea, vomiting, chest pain, palpitations, SOB, numbness, tingling, new weakness. (30 May 2025 18:23)    He presents today for cerebral angiogram with MMA embolization and percutaneous subdural drain placement. He reports his headache is better with ofirmev otherwise denies new symptoms.       Allergies: Levaquin (Swelling)      PAST MEDICAL & SURGICAL HISTORY:  Hyperlipidemia  Hypertension  Prostate CA  Seasonal allergies  BPH (benign prostatic hyperplasia)  Ulcerative colitis  H/O cardiac catheterization, at [facility] no stents as per patient  ervical vertebral fusion, surgery 1996  H/O hernia repair, left and right inguinal in 2019  and umbilical repair  Status post correction of deviated nasal septum, 1970s      FAMILY HISTORY:  FH: prostate cancer (Sibling)      Current Medications:   chlorhexidine 2% Cloths 1 Application(s) Topical daily  hydrALAZINE Injectable 10 milliGRAM(s) IV Push every 2 hours PRN  labetalol Injectable 10 milliGRAM(s) IV Push every 2 hours PRN  levETIRAcetam   Injectable 500 milliGRAM(s) IV Push every 12 hours  povidone iodine 10% Nasal Swab 1 Application(s) Both Nostrils once  sodium chloride 0.9%. 1000 milliLiter(s) IV Continuous <Continuous>      Physical Exam:  Constitutional: NAD, lying in bed  Neuro  * Mental Status:  GCS 15: Awake, alert, oriented to conversation. No aphasia or difficulty speaking. No dysarthria. Able to name objects and their function.  * Cranial Nerves: Cnii-Cnxii grossly intact. PERRL, EOMI, tongue midline, no gaze deviation  * Motor: RUE 5/5, LUE 5/5 with mild drift, RLE 4/5 baseline, LLE 4+/5 with mild drift   * Sensory: Sensation intact to light touch  * Reflexes: not assessed   Cardiovascular: Regular rate and rhythm.  Eyes: See neurologic examination with detailed examination of eyes.  ENT: No JVD, Trachea Midline  Respiratory: non labored breathing   Gastrointestinal: Soft, nontender, nondistended.  Genitourinary: [ ] Scott, [ x ] No Scott.   Musculoskeletal: No muscle wasting noted, (See neurologic assessment for full muscle strength assessment) .  Skin:  no wounds, no redness, no abrasions noted  Hematologic / Lymph / Immunologic: No bleeding from IV sites or wounds      [facility] SS:  DATE: 5/31/25   TIME: 0845  1A: Level of consciousness (0-3): 0  1B: Questions (0-2): 0    1C: Commands (0-2): 0  2: Gaze (0-2): 0  3: Visual fields (0-3): 0  4: Facial palsy (0-3): 0  MOTOR:  5A: Left arm motor drift (0-4): 1  5B: Right arm motor drift (0-4): 0  6A: Left leg motor drift (0-4): 1  6B: Right leg motor drift (0-4): 0  7: Limb ataxia (0-2): 0  SENSORY:  8: Sensation (0-2): 0  SPEECH:  9: Language (0-3): 0  10: Dysarthria (0-2): 0  EXTINCTION:  11: Extinction/inattention (0-2): 0    TOTAL SCORE: 2      Labs:                         12.3   3.13  )-----------( 165      ( 31 May 2025 02:30 )             38.2       05-31    136  |  103  |  19.7  ----------------------------<  85  4.3   |  23.0  |  0.93    Ca    8.6      31 May 2025 02:30  Phos  2.9     05-31  Mg     2.2     05-31    TPro  6.2[L]  /  Alb  3.7  /  TBili  0.5  /  DBili  x   /  AST  18  /  ALT  13  /  AlkPhos  74  05-30    PT/INR - ( 30 May 2025 18:01 )   PT: 11.8 sec;   INR: 1.04 ratio    PTT - ( 30 May 2025 18:01 )  PTT:32.9 sec      Assessment/Plan:   This is a 79y Male presents with R SDH. Patient presents to neuro-IR for selective cerebral angiography with MMA embolization and percutaneous subdural drain placement.     Procedure, goals, risks, benefits and alternatives were discussed with patient and patient's family.  All questions were answered.  Risks include but are not limited to stroke, vessel injury, hemorrhage, and or groin hematoma.  Patient demonstrates understanding  of all risks involved with this procedure and wishes to continue.   Appropriate  consent was obtained from patient and consent is in the patient's chart.

## 2025-05-31 NOTE — PROGRESS NOTE ADULT - SUBJECTIVE AND OBJECTIVE BOX
79yM PMH HLD, HTN, prostate CA, seasonal allergies, BPH, cardiac catheterization many years ago s/p 2 stents originally on Plavix but then stopped 2/2 ulcerative colitis and never went on ASA, h/o prostate CA, lung CA, melanoma s/p resection, remote history of afib after pulmonary testing several years ago originally on Eliquis but it flared up his ulcerative colitis so came off of it and hasn't heard about arrhythmia since then, squamous cell CA s/p radiation, h/o ACDF 1996, h/o anterior approach lumbar fusion in [place], [place] 1 year ago 5/28/24, now presents to ED s/p fall Saturday 5/24 without LOC, since fall has had difficulty walking and headaches unrelieved (at worst 5/10, currently 3/10) despite taking Tylenol/Advil. Attests to mild RLQ abdominal pain since he has for several months now. Denies dizziness, nausea, vomiting, chest pain, palpitations, SOB, numbness, tingling, new weakness.   (30 May 2025 18:23)    24 hr events: underwent  Selective Cerebral Angiography with MMA embolization and percutaneous subdural drain placement this am; tolerated procedure well.    ICU Vital Signs Last 24 Hrs  T(C): 36.6 (31 May 2025 04:23), Max: 36.9 (31 May 2025 00:00)  T(F): 97.8 (31 May 2025 04:23), Max: 98.4 (31 May 2025 00:00)  HR: 86 (31 May 2025 12:25) (61 - 93)  BP: 137/68 (31 May 2025 12:25) (117/58 - 160/87)  BP(mean): 88 (31 May 2025 12:25) (70 - 111)  ABP: --  ABP(mean): --  RR: 15 (31 May 2025 12:25) (11 - 24)  SpO2: 99% (31 May 2025 12:25) (93% - 100%)    O2 Parameters below as of 31 May 2025 11:55  Patient On (Oxygen Delivery Method): nasal cannula  O2 Flow (L/min): 4      Exam: recovering after procedure.  NAD, cooperative;  Awake, alert, oriented to 3 spheres; speech clear; comprehension seem intact.  PERRL, EOMI, tongue midline.  Motor: RUE 5/5, LUE 5/5 with mild drift, BL LE 4/5.  Cardiovascular: S1S2 present  Respiratory: non labored breathing   Gastrointestinal: Soft, nontender, nondistended.  No peripheral swelling.

## 2025-06-01 LAB
ANION GAP SERPL CALC-SCNC: 15 MMOL/L — SIGNIFICANT CHANGE UP (ref 5–17)
BUN SERPL-MCNC: 24.9 MG/DL — HIGH (ref 8–20)
CALCIUM SERPL-MCNC: 7.9 MG/DL — LOW (ref 8.4–10.5)
CHLORIDE SERPL-SCNC: 102 MMOL/L — SIGNIFICANT CHANGE UP (ref 96–108)
CO2 SERPL-SCNC: 19 MMOL/L — LOW (ref 22–29)
CREAT SERPL-MCNC: 1.1 MG/DL — SIGNIFICANT CHANGE UP (ref 0.5–1.3)
EGFR: 69 ML/MIN/1.73M2 — SIGNIFICANT CHANGE UP
EGFR: 69 ML/MIN/1.73M2 — SIGNIFICANT CHANGE UP
GLUCOSE SERPL-MCNC: 116 MG/DL — HIGH (ref 70–99)
HCT VFR BLD CALC: 34.9 % — LOW (ref 39–50)
HGB BLD-MCNC: 11.4 G/DL — LOW (ref 13–17)
MAGNESIUM SERPL-MCNC: 2.1 MG/DL — SIGNIFICANT CHANGE UP (ref 1.6–2.6)
MCHC RBC-ENTMCNC: 29.9 PG — SIGNIFICANT CHANGE UP (ref 27–34)
MCHC RBC-ENTMCNC: 32.7 G/DL — SIGNIFICANT CHANGE UP (ref 32–36)
MCV RBC AUTO: 91.6 FL — SIGNIFICANT CHANGE UP (ref 80–100)
NRBC # BLD AUTO: 0 K/UL — SIGNIFICANT CHANGE UP (ref 0–0)
NRBC # FLD: 0 K/UL — SIGNIFICANT CHANGE UP (ref 0–0)
NRBC BLD AUTO-RTO: 0 /100 WBCS — SIGNIFICANT CHANGE UP (ref 0–0)
PHOSPHATE SERPL-MCNC: 4.1 MG/DL — SIGNIFICANT CHANGE UP (ref 2.4–4.7)
PLATELET # BLD AUTO: 166 K/UL — SIGNIFICANT CHANGE UP (ref 150–400)
PMV BLD: 9.7 FL — SIGNIFICANT CHANGE UP (ref 7–13)
POTASSIUM SERPL-MCNC: 5.1 MMOL/L — SIGNIFICANT CHANGE UP (ref 3.5–5.3)
POTASSIUM SERPL-SCNC: 5.1 MMOL/L — SIGNIFICANT CHANGE UP (ref 3.5–5.3)
RBC # BLD: 3.81 M/UL — LOW (ref 4.2–5.8)
RBC # FLD: 13 % — SIGNIFICANT CHANGE UP (ref 10.3–14.5)
SODIUM SERPL-SCNC: 136 MMOL/L — SIGNIFICANT CHANGE UP (ref 135–145)
WBC # BLD: 4.79 K/UL — SIGNIFICANT CHANGE UP (ref 3.8–10.5)
WBC # FLD AUTO: 4.79 K/UL — SIGNIFICANT CHANGE UP (ref 3.8–10.5)

## 2025-06-01 PROCEDURE — 99291 CRITICAL CARE FIRST HOUR: CPT

## 2025-06-01 PROCEDURE — 93970 EXTREMITY STUDY: CPT | Mod: 26

## 2025-06-01 PROCEDURE — 70450 CT HEAD/BRAIN W/O DYE: CPT | Mod: 26,76

## 2025-06-01 RX ORDER — ACETAMINOPHEN 500 MG/5ML
1000 LIQUID (ML) ORAL ONCE
Refills: 0 | Status: COMPLETED | OUTPATIENT
Start: 2025-06-01 | End: 2025-06-02

## 2025-06-01 RX ORDER — SODIUM BICARBONATE 1 MEQ/ML
0.22 SYRINGE (ML) INTRAVENOUS
Qty: 150 | Refills: 0 | Status: DISCONTINUED | OUTPATIENT
Start: 2025-06-01 | End: 2025-06-01

## 2025-06-01 RX ORDER — LABETALOL HYDROCHLORIDE 200 MG/1
10 TABLET, FILM COATED ORAL EVERY 4 HOURS
Refills: 0 | Status: DISCONTINUED | OUTPATIENT
Start: 2025-06-01 | End: 2025-06-03

## 2025-06-01 RX ORDER — LEVETIRACETAM 10 MG/ML
500 INJECTION, SOLUTION INTRAVENOUS
Refills: 0 | Status: DISCONTINUED | OUTPATIENT
Start: 2025-06-01 | End: 2025-06-04

## 2025-06-01 RX ORDER — POLYETHYLENE GLYCOL 3350 17 G/17G
17 POWDER, FOR SOLUTION ORAL DAILY
Refills: 0 | Status: DISCONTINUED | OUTPATIENT
Start: 2025-06-01 | End: 2025-06-04

## 2025-06-01 RX ORDER — ENOXAPARIN SODIUM 100 MG/ML
40 INJECTION SUBCUTANEOUS EVERY 24 HOURS
Refills: 0 | Status: DISCONTINUED | OUTPATIENT
Start: 2025-06-01 | End: 2025-06-04

## 2025-06-01 RX ADMIN — ENOXAPARIN SODIUM 40 MILLIGRAM(S): 100 INJECTION SUBCUTANEOUS at 21:50

## 2025-06-01 RX ADMIN — LEVETIRACETAM 500 MILLIGRAM(S): 10 INJECTION, SOLUTION INTRAVENOUS at 05:38

## 2025-06-01 RX ADMIN — LEVETIRACETAM 500 MILLIGRAM(S): 10 INJECTION, SOLUTION INTRAVENOUS at 18:11

## 2025-06-01 RX ADMIN — ROSUVASTATIN CALCIUM 10 MILLIGRAM(S): 20 TABLET, FILM COATED ORAL at 21:50

## 2025-06-01 RX ADMIN — Medication 650 MILLIGRAM(S): at 21:50

## 2025-06-01 RX ADMIN — TAMSULOSIN HYDROCHLORIDE 0.4 MILLIGRAM(S): 0.4 CAPSULE ORAL at 21:50

## 2025-06-01 RX ADMIN — Medication 650 MILLIGRAM(S): at 23:07

## 2025-06-01 RX ADMIN — MONTELUKAST SODIUM 10 MILLIGRAM(S): 10 TABLET ORAL at 12:03

## 2025-06-01 RX ADMIN — Medication 1 APPLICATION(S): at 12:17

## 2025-06-01 RX ADMIN — Medication 125 MEQ/KG/HR: at 12:03

## 2025-06-01 RX ADMIN — Medication 650 MILLIGRAM(S): at 08:23

## 2025-06-01 RX ADMIN — Medication 650 MILLIGRAM(S): at 09:00

## 2025-06-01 NOTE — SBIRT NOTE ADULT - NSSBIRTBRIEFINTDET_GEN_A_CORE
Patient reports daily alcohol intake - reports he drinks wine or whiskey with or after dinner - reports he has 1-2 drinks. Patient reports no concerns/issues with intake at this time.

## 2025-06-01 NOTE — PROGRESS NOTE ADULT - SUBJECTIVE AND OBJECTIVE BOX
HPI:    HISTORY OF PRESENT ILLNESS:   79yM PMH HLD, HTN, prostate CA, seasonal allergies, BPH, cardiac catheterization many years ago s/p 2 stents originally on Plavix but then stopped 2/2 ulcerative colitis and never went on ASA, h/o prostate CA, lung CA, melanoma s/p resection, remote history of afib after pulmonary testing several years ago originally on Eliquis but it flared up his ulcerative colitis so came off of it and hasn't heard about arrhythmia since then, squamous cell CA s/p radiation, h/o ACDF 1996, h/o anterior approach lumbar fusion in [place], [place] 1 year ago 5/28/24, now presents to ED s/p fall Saturday 5/24 without LOC, since fall has had difficulty walking and headaches unrelieved (at worst 5/10, currently 3/10) despite taking Tylenol/Advil. Attests to mild RLQ abdominal pain since he has for several months now. Denies dizziness, nausea, vomiting, chest pain, palpitations, SOB, numbness, tingling, new weakness.        Vital Signs Last 24 Hrs  T(C): 36.7 (30 May 2025 11:56), Max: 36.7 (30 May 2025 11:56)  T(F): 98 (30 May 2025 11:56), Max: 98 (30 May 2025 11:56)  HR: 71 (30 May 2025 11:56) (71 - 80)  BP: 131/67 (30 May 2025 11:56) (112/66 - 131/67)  BP(mean): --  RR: 20 (30 May 2025 11:56) (20 - 20)  SpO2: 98% (30 May 2025 11:56) (98% - 98%)    Parameters below as of 30 May 2025 11:56  Patient On (Oxygen Delivery Method): room air    Exam:      GENERAL: NAD, well-groomed     HEAD: Atraumatic, normocephalic     COURTNEY COMA SCORE: E- 4 V- 5 M- 6=15     MENTAL STATUS: AAO x3; Appropriately conversant without aphasia; following commands     CRANIAL NERVES: PERRL. EOMI without nystagmus. Facial sensation intact V1-3 distribution b/l. Face symmetric w/ normal eye closure and smile, tongue midline. Hearing grossly intact. Speech clear     MOTOR: strength 5/5 upper extremities, 4+/5 RLE  5/5 LLE     SENSATION: grossly intact to light touch all extremities     CHEST/LUNG: Nonlabored     HEART: regular rate     LABS:       Overnight events: patient seen and examined at bedside with family present. Subdural drain actively draining dark serosangous fluids. States had a headache prior which relieved with ofirmev medication     Vital Signs Last 24 Hrs  T(C): 36.8 (31 May 2025 23:53), Max: 36.9 (31 May 2025 00:22)  T(F): 98.2 (31 May 2025 23:53), Max: 98.4 (31 May 2025 00:22)  HR: 90 (31 May 2025 23:00) (61 - 95)  BP: 102/55 (31 May 2025 23:00) (102/55 - 160/87)  BP(mean): 70 (31 May 2025 23:00) (70 - 111)  RR: 20 (31 May 2025 23:00) (11 - 24)  SpO2: 96% (31 May 2025 23:00) (93% - 100%)    Parameters below as of 31 May 2025 16:00  Patient On (Oxygen Delivery Method): room air          LABS:                        12.3   3.13  )-----------( 165      ( 31 May 2025 02:30 )             38.2     05-31    136  |  103  |  19.7  ----------------------------<  85  4.3   |  23.0  |  0.93    Ca    8.6      31 May 2025 02:30  Phos  2.9     05-31  Mg     2.2     05-31    TPro  6.2[L]  /  Alb  3.7  /  TBili  0.5  /  DBili  x   /  AST  18  /  ALT  13  /  AlkPhos  74  05-30    PT/INR - ( 30 May 2025 18:01 )   PT: 11.8 sec;   INR: 1.04 ratio         PTT - ( 30 May 2025 18:01 )  PTT:32.9 sec  Urinalysis Basic - ( 31 May 2025 02:30 )    Color: x / Appearance: x / SG: x / pH: x  Gluc: 85 mg/dL / Ketone: x  / Bili: x / Urobili: x   Blood: x / Protein: x / Nitrite: x   Leuk Esterase: x / RBC: x / WBC x   Sq Epi: x / Non Sq Epi: x / Bacteria: x        05-30 @ 07:01  -  05-31 @ 07:00  --------------------------------------------------------  IN: 1100 mL / OUT: 300 mL / NET: 800 mL    05-31 @ 07:01  -  06-01 @ 00:03  --------------------------------------------------------  IN: 600 mL / OUT: 1264 mL / NET: -664 mL        RADIOLOGY & ADDITIONAL TESTS:  < from: CT Head No Cont (05.31.25 @ 14:52) >  IMPRESSION:    1.  Improved right hemispheric subdural hematoma, status post drainage   and embolization.      < end of copied text >   HPI:    HISTORY OF PRESENT ILLNESS:   79yM PMH HLD, HTN, prostate CA, seasonal allergies, BPH, cardiac catheterization many years ago s/p 2 stents originally on Plavix but then stopped 2/2 ulcerative colitis and never went on ASA, h/o prostate CA, lung CA, melanoma s/p resection, remote history of afib after pulmonary testing several years ago originally on Eliquis but it flared up his ulcerative colitis so came off of it and hasn't heard about arrhythmia since then, squamous cell CA s/p radiation, h/o ACDF 1996, h/o anterior approach lumbar fusion in [place], [place] 1 year ago 5/28/24, now presents to ED s/p fall Saturday 5/24 without LOC, since fall has had difficulty walking and headaches unrelieved (at worst 5/10, currently 3/10) despite taking Tylenol/Advil. Attests to mild RLQ abdominal pain since he has for several months now. Denies dizziness, nausea, vomiting, chest pain, palpitations, SOB, numbness, tingling, new weakness.        Vital Signs Last 24 Hrs  T(C): 36.7 (30 May 2025 11:56), Max: 36.7 (30 May 2025 11:56)  T(F): 98 (30 May 2025 11:56), Max: 98 (30 May 2025 11:56)  HR: 71 (30 May 2025 11:56) (71 - 80)  BP: 131/67 (30 May 2025 11:56) (112/66 - 131/67)  BP(mean): --  RR: 20 (30 May 2025 11:56) (20 - 20)  SpO2: 98% (30 May 2025 11:56) (98% - 98%)    Parameters below as of 30 May 2025 11:56  Patient On (Oxygen Delivery Method): room air    Exam:      GENERAL: NAD, well-groomed     HEAD: Atraumatic, normocephalic . 1 subdural drain to EVD setup (to gravity)     COURTNEY COMA SCORE: E- 4 V- 5 M- 6=15     MENTAL STATUS: AAO x3; Appropriately conversant without aphasia; following commands     CRANIAL NERVES: PERRL. EOMI without nystagmus. Facial sensation intact V1-3 distribution b/l. Face symmetric w/ normal eye closure and smile, tongue midline. Hearing grossly intact. Speech clear     MOTOR: strength 5/5 upper extremities, 4+/5 RLE  5/5 LLE     SENSATION: grossly intact to light touch all extremities     CHEST/LUNG: Nonlabored     HEART: regular rate     LABS:       Overnight events: patient seen and examined at bedside with family present. Subdural drain actively draining dark serosangous fluids. States had a headache prior which relieved with ofirmev medication     Vital Signs Last 24 Hrs  T(C): 36.8 (31 May 2025 23:53), Max: 36.9 (31 May 2025 00:22)  T(F): 98.2 (31 May 2025 23:53), Max: 98.4 (31 May 2025 00:22)  HR: 90 (31 May 2025 23:00) (61 - 95)  BP: 102/55 (31 May 2025 23:00) (102/55 - 160/87)  BP(mean): 70 (31 May 2025 23:00) (70 - 111)  RR: 20 (31 May 2025 23:00) (11 - 24)  SpO2: 96% (31 May 2025 23:00) (93% - 100%)    Parameters below as of 31 May 2025 16:00  Patient On (Oxygen Delivery Method): room air          LABS:                        12.3   3.13  )-----------( 165      ( 31 May 2025 02:30 )             38.2     05-31    136  |  103  |  19.7  ----------------------------<  85  4.3   |  23.0  |  0.93    Ca    8.6      31 May 2025 02:30  Phos  2.9     05-31  Mg     2.2     05-31    TPro  6.2[L]  /  Alb  3.7  /  TBili  0.5  /  DBili  x   /  AST  18  /  ALT  13  /  AlkPhos  74  05-30    PT/INR - ( 30 May 2025 18:01 )   PT: 11.8 sec;   INR: 1.04 ratio         PTT - ( 30 May 2025 18:01 )  PTT:32.9 sec  Urinalysis Basic - ( 31 May 2025 02:30 )    Color: x / Appearance: x / SG: x / pH: x  Gluc: 85 mg/dL / Ketone: x  / Bili: x / Urobili: x   Blood: x / Protein: x / Nitrite: x   Leuk Esterase: x / RBC: x / WBC x   Sq Epi: x / Non Sq Epi: x / Bacteria: x        05-30 @ 07:01  -  05-31 @ 07:00  --------------------------------------------------------  IN: 1100 mL / OUT: 300 mL / NET: 800 mL    05-31 @ 07:01  -  06-01 @ 00:03  --------------------------------------------------------  IN: 600 mL / OUT: 1264 mL / NET: -664 mL        RADIOLOGY & ADDITIONAL TESTS:  < from: CT Head No Cont (05.31.25 @ 14:52) >  IMPRESSION:    1.  Improved right hemispheric subdural hematoma, status post drainage   and embolization.      < end of copied text >

## 2025-06-01 NOTE — DIETITIAN INITIAL EVALUATION ADULT - NUTRITIONGOAL OUTCOME1
[de-identified] : Constitutional: Well developed, well nourished, able to communicate Neuro: Normal sensation, No focal deficits Skin: Intact CV: Peripheral vascular exam grossly normal Pulm: No signs of respiratory distress Psych: Oriented, normal mood and affect Pt will meet >75% of estimated protein-energy needs via tolerated route

## 2025-06-01 NOTE — PHYSICAL THERAPY INITIAL EVALUATION ADULT - PERTINENT HX OF CURRENT PROBLEM, REHAB EVAL
79yM PMH HLD, HTN, prostate CA, seasonal allergies, BPH, cardiac catheterization many years ago s/p 2 stents originally on Plavix but then stopped 2/2 ulcerative colitis and never went on ASA, h/o prostate CA, lung CA, melanoma s/p resection, remote history of afib after pulmonary testing several years ago originally on Eliquis but it flared up his ulcerative colitis so came off of it and hasn't heard about arrhythmia since then, squamous cell CA s/p radiation, h/o ACDF 1996, h/o anterior approach lumbar fusion in [place], [place] 1 year ago 5/28/24, now presents to ED s/p fall Saturday 5/24 without LOC, since fall has had difficulty walking and headaches unrelieved (at worst 5/10, currently 3/10) despite taking Tylenol/Advil; 05/31 underwent  Selective Cerebral Angiography with MMA embolization and percutaneous subdural drain placement

## 2025-06-01 NOTE — DIETITIAN INITIAL EVALUATION ADULT - OTHER INFO
79yM with acute on chronic R SDH with mass effect/brain compression, now s/p MMA embolization and percutaneous subdural drain placement 05/31.

## 2025-06-01 NOTE — OCCUPATIONAL THERAPY INITIAL EVALUATION ADULT - RANGE OF MOTION EXAMINATION, UPPER EXTREMITY
active ROM right UE 1/4-1/2 shoulder flexion, 3/4 elbow flexion, 3/4 digit flexion; Left UE 1/2 shoulder flexion, 3/4-full elbow flexion, 3/4 digit flexion 23-Apr-2023 23:34

## 2025-06-01 NOTE — PROGRESS NOTE ADULT - SUBJECTIVE AND OBJECTIVE BOX
79yM PMH HLD, HTN, prostate CA, seasonal allergies, BPH, cardiac catheterization many years ago s/p 2 stents originally on Plavix but then stopped 2/2 ulcerative colitis and never went on ASA, h/o prostate CA, lung CA, melanoma s/p resection, remote history of afib after pulmonary testing several years ago originally on Eliquis but it flared up his ulcerative colitis so came off of it and hasn't heard about arrhythmia since then, squamous cell CA s/p radiation, h/o ACDF 1996, h/o anterior approach lumbar fusion in [place], [place] 1 year ago 5/28/24, now presents to ED s/p fall Saturday 5/24 without LOC, since fall has had difficulty walking and headaches unrelieved (at worst 5/10, currently 3/10) despite taking Tylenol/Advil. Attests to mild RLQ abdominal pain since he has for several months now. Denies dizziness, nausea, vomiting, chest pain, palpitations, SOB, numbness, tingling, new weakness.   (30 May 2025 18:23)    05/31 underwent  Selective Cerebral Angiography with MMA embolization and percutaneous subdural drain placement this am; tolerated procedure well.    24 hr events: none reported.      ICU Vital Signs Last 24 Hrs  T(C): 36.7 (01 Jun 2025 11:43), Max: 36.9 (31 May 2025 21:34)  T(F): 98.1 (01 Jun 2025 11:43), Max: 98.4 (31 May 2025 21:34)  HR: 83 (01 Jun 2025 12:00) (64 - 95)  BP: 111/59 (01 Jun 2025 12:00) (92/57 - 143/75)  BP(mean): 72 (01 Jun 2025 12:00) (65 - 95)  ABP: --  ABP(mean): --  RR: 18 (01 Jun 2025 12:00) (13 - 23)  SpO2: 95% (01 Jun 2025 12:00) (94% - 100%)    O2 Parameters below as of 01 Jun 2025 12:00  Patient On (Oxygen Delivery Method): room air          Exam: NAD, cooperative.  Awake, alert, oriented x3; speech clear; comprehension seem intact.  PERRL, EOMI, tongue midline.  Motor: RUE 5/5, LUE 5/5, BL LE 4/5.  Cardiovascular: S1S2 present  Respiratory: CTAB  Gastrointestinal: Soft, nontender, nondistended.  No peripheral swelling.

## 2025-06-01 NOTE — PROGRESS NOTE ADULT - ASSESSMENT
79yM  s/p fall Saturday 5/24 without LOC p/w with worsening gait instabiity and headaches (recent advil use). POD 1 MMA Embolization and percutaneous subdural drain placement            PLAN:     - D/w Dr. Reid     - Q1 neuro checks, HOB 30 degrees. Admit to NSICU      - post procedure CTH shows improved subdural hematoma w/ improved Midline shift      - will obtain CT Head in AM 3am-5am 6/1     - Keppra 500 Q12     - subdural drain to EVD setup. Maintain open and to gravity(floor)      - Normotensive BP goals, SBP < 160     - Hold ACT/APT     - hold chemical DVT prophylaxis at this time given acute on chronic SDH.

## 2025-06-01 NOTE — DIETITIAN INITIAL EVALUATION ADULT - PERTINENT MEDS FT
MEDICATIONS  (STANDING):  levETIRAcetam   Injectable 500 milliGRAM(s) IV Push every 12 hours  rosuvastatin 10 milliGRAM(s) Oral at bedtime  sodium bicarbonate  Infusion 0.22 mEq/kG/Hr (125 mL/Hr) IV Continuous <Continuous>    MEDICATIONS  (PRN):  hydrALAZINE Injectable 10 milliGRAM(s) IV Push every 2 hours PRN SBP > 160  labetalol Injectable 10 milliGRAM(s) IV Push every 2 hours PRN SBP > 160  traMADol 25 milliGRAM(s) Oral every 6 hours PRN Moderate Pain (4 - 6)  traMADol 50 milliGRAM(s) Oral every 6 hours PRN Severe Pain (7 - 10)

## 2025-06-01 NOTE — PROGRESS NOTE ADULT - ASSESSMENT
79yM with acute on chronic R SDh with mass effect/brain compression, now s/p MMA embolization and percutaneous subdural drain placement 05/31.  S/p mechanical fall 05/24 with head strike.  PMH including CAD s/p cardiac stents several years ago (off Plavix but then stopped 2/2 ulcerative colitis and never went on ASA), remote history of afib several years ago (off Eliquis due to Inflamm BD.    PLAN:  - cont frequent neurochecks  - STAT CTh if worsening neuro status  - drain removal today, per NSGy; post-pull CTh  - cont Keppra for sz ppx  - pain control, avoid oversedation  - SBP goal: 100-160, PRN meds  - maintain Osats>92%, incentive spirometry  - diet as tolerated, BM regimen  - monitor e-lytes, UOP; slight increase in Cr/K with low bicarb - likely due to contrast - bicarb gtt for 6 hrs   - maintain -180, ISS  - VTE ppx with SCDs, hold anti-thrombotics with SDH an ddrain removal  - possible downgrade later in PM today if remains clinically and radiographically stable

## 2025-06-01 NOTE — OCCUPATIONAL THERAPY INITIAL EVALUATION ADULT - PERTINENT HX OF CURRENT PROBLEM, REHAB EVAL
Pt was s/p fall on 5/24/25 without LOC; presents with difficulty walking and unrelieved headaches despite taking pain medication

## 2025-06-01 NOTE — DIETITIAN INITIAL EVALUATION ADULT - ENERGY INTAKE
Pt reports fair appetite/po intake at this time and overall is improving. Receptive to receive oral nutrition supplement to maximize nutrition status. Pt states he does not eat chicken.  Fair (50-75%)

## 2025-06-01 NOTE — CHART NOTE - NSCHARTNOTEFT_GEN_A_CORE
NSCU Transfer Note    [facility]    Transfer to: (  ) Medicine    (  ) Telemetry    (  ) RCU   (x) Neurosurgery                               (  ) Palliative    (  ) Stroke Unit    (  ) MICU    (  ) __________________    Accepting Physician: Dr. Reid    Signout given to: Neurosurgery    HPI / NSCU COURSE:  79yM PMH HLD, HTN, prostate CA, seasonal allergies, BPH, cardiac catheterization many years ago s/p 2 stents originally on Plavix but then stopped 2/2 ulcerative colitis and never went on ASA, h/o prostate CA, lung CA, melanoma s/p resection, remote history of afib after pulmonary testing several years ago originally on Eliquis but it flared up his ulcerative colitis so came off of it and hasn't heard about arrhythmia since then, squamous cell CA s/p radiation, h/o ACDF , h/o anterior approach lumbar fusion in [place], [place] 1 year ago 24, now presents to ED s/p  without LOC, since fall has had difficulty walking and headaches unrelieved despite taking Tylenol/Advil. Attests to mild RLQ abdominal pain since he has for several months now. Denies dizziness, nausea, vomiting, chest pain, palpitations, SOB, numbness, tingling, new weakness.  Pt now s/p R MMA embolization with flower and right percutaneous subdural drain placement under general anesthesia along with local anesthetic on . Subdural drain connected to EVD setup and closed.   On  Subdural drain removed, post pull CTH stable. Neurologically stable and hemodynamically stable for downgrade to telemetry. PT rec acute inpatient rehab.       Vital Signs Last 24 Hrs  T(C): 36.7 (2025 11:43), Max: 36.9 (31 May 2025 21:34)  T(F): 98.1 (2025 11:43), Max: 98.4 (31 May 2025 21:34)  HR: 85 (2025 13:00) (64 - 95)  BP: 115/62 (2025 13:00) (92/57 - 143/75)  BP(mean): 74 (2025 13:00) (65 - 95)  RR: 18 (2025 13:00) (14 - 23)  SpO2: 97% (2025 13:00) (94% - 99%)    Parameters below as of 2025 12:00  Patient On (Oxygen Delivery Method): room air        I&O's Summary    31 May 2025 07:01  -  2025 07:00  --------------------------------------------------------  IN: 600 mL / OUT: 1599 mL / NET: -999 mL    2025 07:01  -  2025 14:34  --------------------------------------------------------  IN: 725 mL / OUT: 4 mL / NET: 721 mL        Physical Exam:   AAOX3, cooperative, follows commands, speech fluent, comprehension seem intact, face symmetric, tongue midline, EOMI, PERRLA, motor - ARTHUR 5/5 with no drift, sensation grossly intact.  S1S2 present.  Chest rise symmetric  Abd soft, NT, ND.  No peripheral swelling.  Drain incision site c/d/i, groin site soft    LABS:                               11.4   4.79  )-----------( 166      ( 2025 01:30 )             34.9       06-01    136  |  102  |  24.9[H]  ----------------------------<  116[H]  5.1   |  19.0[L]  |  1.10    Ca    7.9[L]      2025 01:30  Phos  4.1     06-01  Mg     2.1     06-01    TPro  6.2[L]  /  Alb  3.7  /  TBili  0.5  /  DBili  x   /  AST  18  /  ALT  13  /  AlkPhos  74  05-30      PT/INR - ( 30 May 2025 18:01 )   PT: 11.8 sec;   INR: 1.04 ratio         PTT - ( 30 May 2025 18:01 )  PTT:32.9 sec        Imaging:  < from: CT Head No Cont (25 @ 14:52) >      IMPRESSION:    1.  Improved right hemispheric subdural hematoma, status post drainage   and embolization.        Patient Name: JAISON WOOD  MRN: [id number], : 1945    --- End of Report ---    < end of copied text >    < from: CT Head No Cont (25 @ 04:51) >      IMPRESSION: Stable exam with underlying for differences in technique.    ---End of Report ---    < end of copied text >        ASSESSMENT & PLAN:  79yM  s/p fall  without LOC p/w with worsening gait instabiity and headaches (recent advil use). POD 1 MMA Embolization and percutaneous subdural drain placement.     Neuro  - Neuro q4h  - SD drain removed- post pull CTH stable  - Pain control w/ Tylenol PRN   - Keppra 500 mg BID  - STAT CTH for any acute neurologic exam change     Cards  - -160     Pulm  - RA   - encourage IS     GI   - Regular diet   - bowel regimen: miralax  - last BM:     Renal   - IVL   - Voiding   - c/w flomax  - 6h bicarb gtt for downtrending bicarb and uptrending Cr    Heme  - SCD's, SQL    ID   - afebrile     D/w Dr. Reid   PT/OT recs Acute inpatient rehab

## 2025-06-01 NOTE — PHYSICAL THERAPY INITIAL EVALUATION ADULT - ADDITIONAL COMMENTS
Pt reprots he lives in a house with his wife who is at [facility] and can asisst. 3 BALTAZAR/1rail, no stairs to negotiate inside. Pt was modified independent PTA with a SAC. Also owns a handicapped height toilet.

## 2025-06-01 NOTE — PHYSICAL THERAPY INITIAL EVALUATION ADULT - NAME OF CLINICIAN
return to ED if symptoms worsen, persist or questions arise/need for outpatient follow-up Yannick HOLCOMB

## 2025-06-01 NOTE — DIETITIAN INITIAL EVALUATION ADULT - PERTINENT LABORATORY DATA
06-01    136  |  102  |  24.9[H]  ----------------------------<  116[H]  5.1   |  19.0[L]  |  1.10    Ca    7.9[L]      01 Jun 2025 01:30  Phos  4.1     06-01  Mg     2.1     06-01    TPro  6.2[L]  /  Alb  3.7  /  TBili  0.5  /  DBili  x   /  AST  18  /  ALT  13  /  AlkPhos  74  05-30

## 2025-06-01 NOTE — PROGRESS NOTE ADULT - CRITICAL CARE ATTENDING COMMENT
Pt is critically ill and at high risk of rapid deterioration/death due to abovementioned conditions.   Still requires critical care interventions - ongoing frequent evaluations, interventions and management adjustment by the Attending and ICU team, - and included review of relevant history, clinical examination, review of data and images, discussion of treatment with the multidisciplinary team and any consultants involved in this patient’s care.
Pt is critically ill and at high risk of rapid deterioration/death due to abovementioned conditions.   Still requires critical care interventions - ongoing frequent evaluations, interventions and management adjustment by the Attending and ICU team, - and included review of relevant history, clinical examination, review of data and images, discussion of treatment with the multidisciplinary team and any consultants involved in this patient’s care.

## 2025-06-01 NOTE — DIETITIAN INITIAL EVALUATION ADULT - ORAL INTAKE PTA/DIET HISTORY
Pt reports eating well prior to admission, follows a regular diet with no issues. Pt reports weight is ~185 lbs, states he gained ~12 lbs recently. Reports height is 6'0".

## 2025-06-01 NOTE — OCCUPATIONAL THERAPY INITIAL EVALUATION ADULT - LIVES WITH, PROFILE
in a private house with 3 steps to enter with railing and no steps to negotiate inside; pt's wife is available to assist upon discharge/spouse

## 2025-06-01 NOTE — OCCUPATIONAL THERAPY INITIAL EVALUATION ADULT - ADDITIONAL COMMENTS
Pt has shower stall with doors and grab bars  Pt owns a cane and has a handicap toilet height  Pt is right handed

## 2025-06-02 LAB
ANION GAP SERPL CALC-SCNC: 12 MMOL/L — SIGNIFICANT CHANGE UP (ref 5–17)
BUN SERPL-MCNC: 22.5 MG/DL — HIGH (ref 8–20)
CALCIUM SERPL-MCNC: 8.2 MG/DL — LOW (ref 8.4–10.5)
CHLORIDE SERPL-SCNC: 102 MMOL/L — SIGNIFICANT CHANGE UP (ref 96–108)
CO2 SERPL-SCNC: 25 MMOL/L — SIGNIFICANT CHANGE UP (ref 22–29)
CREAT SERPL-MCNC: 1.02 MG/DL — SIGNIFICANT CHANGE UP (ref 0.5–1.3)
EGFR: 75 ML/MIN/1.73M2 — SIGNIFICANT CHANGE UP
EGFR: 75 ML/MIN/1.73M2 — SIGNIFICANT CHANGE UP
GLUCOSE SERPL-MCNC: 122 MG/DL — HIGH (ref 70–99)
HCT VFR BLD CALC: 35 % — LOW (ref 39–50)
HGB BLD-MCNC: 11.3 G/DL — LOW (ref 13–17)
MAGNESIUM SERPL-MCNC: 1.9 MG/DL — SIGNIFICANT CHANGE UP (ref 1.6–2.6)
MCHC RBC-ENTMCNC: 29.7 PG — SIGNIFICANT CHANGE UP (ref 27–34)
MCHC RBC-ENTMCNC: 32.3 G/DL — SIGNIFICANT CHANGE UP (ref 32–36)
MCV RBC AUTO: 91.9 FL — SIGNIFICANT CHANGE UP (ref 80–100)
NRBC # BLD AUTO: 0 K/UL — SIGNIFICANT CHANGE UP (ref 0–0)
NRBC # FLD: 0 K/UL — SIGNIFICANT CHANGE UP (ref 0–0)
NRBC BLD AUTO-RTO: 0 /100 WBCS — SIGNIFICANT CHANGE UP (ref 0–0)
PHOSPHATE SERPL-MCNC: 2.6 MG/DL — SIGNIFICANT CHANGE UP (ref 2.4–4.7)
PLATELET # BLD AUTO: 179 K/UL — SIGNIFICANT CHANGE UP (ref 150–400)
PMV BLD: 9.5 FL — SIGNIFICANT CHANGE UP (ref 7–13)
POTASSIUM SERPL-MCNC: 4.1 MMOL/L — SIGNIFICANT CHANGE UP (ref 3.5–5.3)
POTASSIUM SERPL-SCNC: 4.1 MMOL/L — SIGNIFICANT CHANGE UP (ref 3.5–5.3)
RBC # BLD: 3.81 M/UL — LOW (ref 4.2–5.8)
RBC # FLD: 13.1 % — SIGNIFICANT CHANGE UP (ref 10.3–14.5)
SODIUM SERPL-SCNC: 139 MMOL/L — SIGNIFICANT CHANGE UP (ref 135–145)
WBC # BLD: 3.94 K/UL — SIGNIFICANT CHANGE UP (ref 3.8–10.5)
WBC # FLD AUTO: 3.94 K/UL — SIGNIFICANT CHANGE UP (ref 3.8–10.5)

## 2025-06-02 PROCEDURE — 99223 1ST HOSP IP/OBS HIGH 75: CPT

## 2025-06-02 RX ORDER — SOD PHOS DI, MONO/K PHOS MONO 250 MG
1 TABLET ORAL ONCE
Refills: 0 | Status: COMPLETED | OUTPATIENT
Start: 2025-06-02 | End: 2025-06-02

## 2025-06-02 RX ADMIN — MONTELUKAST SODIUM 10 MILLIGRAM(S): 10 TABLET ORAL at 11:13

## 2025-06-02 RX ADMIN — Medication 1000 MILLIGRAM(S): at 05:07

## 2025-06-02 RX ADMIN — TRAMADOL HYDROCHLORIDE 25 MILLIGRAM(S): 50 TABLET, FILM COATED ORAL at 21:35

## 2025-06-02 RX ADMIN — Medication 650 MILLIGRAM(S): at 09:10

## 2025-06-02 RX ADMIN — TRAMADOL HYDROCHLORIDE 25 MILLIGRAM(S): 50 TABLET, FILM COATED ORAL at 00:59

## 2025-06-02 RX ADMIN — TRAMADOL HYDROCHLORIDE 25 MILLIGRAM(S): 50 TABLET, FILM COATED ORAL at 23:00

## 2025-06-02 RX ADMIN — TAMSULOSIN HYDROCHLORIDE 0.4 MILLIGRAM(S): 0.4 CAPSULE ORAL at 21:35

## 2025-06-02 RX ADMIN — Medication 1 PACKET(S): at 06:22

## 2025-06-02 RX ADMIN — ENOXAPARIN SODIUM 40 MILLIGRAM(S): 100 INJECTION SUBCUTANEOUS at 21:34

## 2025-06-02 RX ADMIN — LEVETIRACETAM 500 MILLIGRAM(S): 10 INJECTION, SOLUTION INTRAVENOUS at 06:22

## 2025-06-02 RX ADMIN — ROSUVASTATIN CALCIUM 10 MILLIGRAM(S): 20 TABLET, FILM COATED ORAL at 21:35

## 2025-06-02 RX ADMIN — LEVETIRACETAM 500 MILLIGRAM(S): 10 INJECTION, SOLUTION INTRAVENOUS at 18:08

## 2025-06-02 RX ADMIN — Medication 400 MILLIGRAM(S): at 04:14

## 2025-06-02 RX ADMIN — TRAMADOL HYDROCHLORIDE 25 MILLIGRAM(S): 50 TABLET, FILM COATED ORAL at 03:54

## 2025-06-02 RX ADMIN — Medication 650 MILLIGRAM(S): at 08:10

## 2025-06-02 NOTE — PROGRESS NOTE ADULT - SUBJECTIVE AND OBJECTIVE BOX
HPI:  79yM PMH HLD, HTN, prostate CA, seasonal allergies, BPH, cardiac catheterization many years ago s/p 2 stents originally on Plavix but then stopped 2/2 ulcerative colitis and never went on ASA, h/o prostate CA, lung CA, melanoma s/p resection, remote history of afib after pulmonary testing several years ago originally on Eliquis but it flared up his ulcerative colitis so came off of it and hasn't heard about arrhythmia since then, squamous cell CA s/p radiation, h/o ACDF 1996, h/o anterior approach lumbar fusion in [place], [place] 1 year ago 5/28/24, now presents to ED s/p fall Saturday 5/24 without LOC, since fall has had difficulty walking and headaches unrelieved (at worst 5/10, currently 3/10) despite taking Tylenol/Advil. Attests to mild RLQ abdominal pain since he has for several months now. Denies dizziness, nausea, vomiting, chest pain, palpitations, SOB, numbness, tingling, new weakness.      INTERVAL HPI/OVERNIGHT EVENTS:  79y Male s/p R MMA embolization and percutaneous subdural drain placement with evacuation on 5/31 with Dr. Reid, pt seen by neurosurgery team, sitting up in bed eating breakfast, Tolerating diet. Last BM 6/1. Patient reports headache but relieved with tramadol and tylenol. No other complaints offered at this time.     Vital Signs Last 24 Hrs  T(C): 36.7 (02 Jun 2025 08:13), Max: 36.7 (01 Jun 2025 11:43)  T(F): 98.1 (02 Jun 2025 08:13), Max: 98.1 (01 Jun 2025 11:43)  HR: 67 (02 Jun 2025 08:00) (62 - 91)  BP: 103/75 (02 Jun 2025 08:00) (103/75 - 148/59)  BP(mean): 83 (02 Jun 2025 08:00) (65 - 91)  RR: 17 (02 Jun 2025 08:00) (14 - 21)  SpO2: 94% (02 Jun 2025 08:00) (92% - 100%)    Parameters below as of 02 Jun 2025 08:00  Patient On (Oxygen Delivery Method): room air    PHYSICAL EXAM:  GENERAL: NAD, well-groomed  HEAD:  Atraumatic, normocephalic  WOUND: Right drain site with staple, open to air   COURTNEY COMA SCORE: E- V- M- = 15       E: 4= opens eyes spontaneously 3= to voice 2= to noxious 1= no opening       V: 5= oriented 4= confused 3= inappropriate words 2= incomprehensible sounds 1= nonverbal 1T= intubated       M: 6= follows commands 5= localizes 4= withdraws 3= flexor posturing 2= extensor posturing 1= no movement  MENTAL STATUS: AAO x3; Awake, Opens eyes spontaneously, Appropriately conversant without aphasia, following simple commands  CRANIAL NERVES: PERRL. EOMI without nystagmus. b/l. Face symmetric w/ normal eye closure and smile, tongue midline. Hearing grossly intact. Speech clear. Head turning and shoulder shrug intact.   MOTOR: strength 5/5 b/l upper and lower extremities  SENSATION: grossly intact to light touch all extremities  CHEST/LUNG: non-labored breathing   ABDOMEN: Soft, nontender, nondistended  SKIN: Warm, dry    LABS:                        11.3   3.94  )-----------( 179      ( 02 Jun 2025 03:36 )             35.0     06-02    139  |  102  |  22.5[H]  ----------------------------<  122[H]  4.1   |  25.0  |  1.02    Ca    8.2[L]      02 Jun 2025 03:36  Phos  2.6     06-02  Mg     1.9     06-02    Urinalysis Basic - ( 02 Jun 2025 03:36 )  Color: x / Appearance: x / SG: x / pH: x  Gluc: 122 mg/dL / Ketone: x  / Bili: x / Urobili: x   Blood: x / Protein: x / Nitrite: x   Leuk Esterase: x / RBC: x / WBC x   Sq Epi: x / Non Sq Epi: x / Bacteria: x    I&O:   06-01 @ 07:01  -  06-02 @ 07:00  --------------------------------------------------------  IN: 1640 mL / OUT: 704 mL / NET: 936 mL    06-02 @ 07:01  -  06-02 @ 08:40  --------------------------------------------------------  IN: 240 mL / OUT: 0 mL / NET: 240 mL    MEDICATIONS:   MEDICATIONS  (STANDING):  enoxaparin Injectable 40 milliGRAM(s) SubCutaneous every 24 hours  levETIRAcetam 500 milliGRAM(s) Oral two times a day  montelukast 10 milliGRAM(s) Oral daily  polyethylene glycol 3350 17 Gram(s) Oral daily  rosuvastatin 10 milliGRAM(s) Oral at bedtime  tamsulosin 0.4 milliGRAM(s) Oral at bedtime    MEDICATIONS  (PRN):  acetaminophen     Tablet .. 650 milliGRAM(s) Oral every 6 hours PRN Temp greater or equal to 38C (100.4F), Mild Pain (1 - 3)  hydrALAZINE Injectable 10 milliGRAM(s) IV Push every 4 hours PRN SBP > 160  labetalol Injectable 10 milliGRAM(s) IV Push every 4 hours PRN SBP > 160  traMADol 25 milliGRAM(s) Oral every 6 hours PRN Moderate Pain (4 - 6)    RADIOLOGY & ADDITIONAL TESTS:  < from: CT Head No Cont (06.01.25 @ 13:07) >  IMPRESSION: Right frontal shunt catheter has been removed, the rest of   this study appears stable.    --- End of Report ---  JOHN ZHANG MD; Attending Radiologist  This document has been electronically signed. Jun 1 20    < end of copied text >    < from: US Duplex Venous Lower Ext Complete, Bilateral (06.01.25 @ 18:39) >  IMPRESSION:  No evidence of deep venous thrombosis in either lower extremity.    --- End of Report ---    ERVIN YAÑEZ MD; Attending Radiologist  This document has been electronically signed. Jun 1 2025  8:20P    < end of copied text >

## 2025-06-02 NOTE — CONSULT NOTE ADULT - SUBJECTIVE AND OBJECTIVE BOX
79yM was admitted on 05-30 after a fall with head strike and developed headaches with associated difficulty walking. Workup showed a right SDH and is s/p angio/MMA embo and percutaneous subdural drain placement on 5/31.       Imaging Reviewed Today:  HEAD CT 5/30 -  Moderate to large acute on subacute subdural hematoma right cerebral convexity with associated mass effect..    CERVICAL SPINE CT  5/30 - No evidence of an acute cervical spine fracture.    HEAD CT 6/1 - Right frontal shunt catheter has been removed, the rest of   this study appears stable.  ----------------------------  Patient reports a headache on the right side.  Patient a bit perseverative on his previous functional status and going to PT.       VITALS  T(C): 36.7 (06-02-25 @ 08:13), Max: 36.7 (06-01-25 @ 11:43)  HR: 67 (06-02-25 @ 08:00) (62 - 91)  BP: 103/75 (06-02-25 @ 08:00) (103/75 - 148/59)  RR: 17 (06-02-25 @ 08:00) (14 - 21)  SpO2: 94% (06-02-25 @ 08:00) (92% - 100%)  Wt(kg): --    PAST MEDICAL & SURGICAL HISTORY  Hyperlipidemia    Hypertension    Prostate CA    Seasonal allergies    BPH (benign prostatic hyperplasia)    Ulcerative colitis    H/O cardiac catheterization    Cervical vertebral fusion    H/O hernia repair    Status post correction of deviated nasal septum         RECENT LABS REVIEWED    CBC Full  -  ( 02 Jun 2025 03:36 )  WBC Count : 3.94 K/uL  RBC Count : 3.81 M/uL  Hemoglobin : 11.3 g/dL  Hematocrit : 35.0 %  Platelet Count - Automated : 179 K/uL  Mean Cell Volume : 91.9 fl  Mean Cell Hemoglobin : 29.7 pg  Mean Cell Hemoglobin Concentration : 32.3 g/dL  Auto Neutrophil # : x  Auto Lymphocyte # : x  Auto Monocyte # : x  Auto Eosinophil # : x  Auto Basophil # : x  Auto Neutrophil % : x  Auto Lymphocyte % : x  Auto Monocyte % : x  Auto Eosinophil % : x  Auto Basophil % : x    06-02    139  |  102  |  22.5[H]  ----------------------------<  122[H]  4.1   |  25.0  |  1.02    Ca    8.2[L]      02 Jun 2025 03:36  Phos  2.6     06-02  Mg     1.9     06-02      Urinalysis Basic - ( 02 Jun 2025 03:36 )    Color: x / Appearance: x / SG: x / pH: x  Gluc: 122 mg/dL / Ketone: x  / Bili: x / Urobili: x   Blood: x / Protein: x / Nitrite: x   Leuk Esterase: x / RBC: x / WBC x   Sq Epi: x / Non Sq Epi: x / Bacteria: x        ALLERGIES  Levaquin (Swelling)      MEDICATIONS   acetaminophen     Tablet .. 650 milliGRAM(s) Oral every 6 hours PRN  enoxaparin Injectable 40 milliGRAM(s) SubCutaneous every 24 hours  hydrALAZINE Injectable 10 milliGRAM(s) IV Push every 4 hours PRN  labetalol Injectable 10 milliGRAM(s) IV Push every 4 hours PRN  levETIRAcetam 500 milliGRAM(s) Oral two times a day  montelukast 10 milliGRAM(s) Oral daily  polyethylene glycol 3350 17 Gram(s) Oral daily  rosuvastatin 10 milliGRAM(s) Oral at bedtime  tamsulosin 0.4 milliGRAM(s) Oral at bedtime  traMADol 25 milliGRAM(s) Oral every 6 hours PRN      ----------------------------------------------------------------------------------------  FUNCTIONAL HISTORY  Lives with spouse, 3 [address]  Independent with [facility]    FUNCTIONAL STATUS/PROGRESS  6/1 PT  Bed Mobility: Supine to Sit:     · Level of Zamora	minimum assist (75% patients effort)  · Physical Assist/Nonphysical Assist	1 person assist    Transfer: Sit to Stand:     · Level of Zamora	moderate assist (50% patients effort)  · Physical Assist/Nonphysical Assist	1 person + 1 person to manage equipment  · Weight-Bearing Restrictions	full weight-bearing  · Assistive Device	rolling walker    Transfer: Stand to Sit:     · Level of Zamora	moderate assist (50% patients effort)  · Physical Assist/Nonphysical Assist	1 person assist  · Weight-Bearing Restrictions	full weight-bearing  · Assistive Device	rolling walker    Gait Skills:     · Level of Zamora	moderate assist (50% patients effort)  · Physical Assist/Nonphysical Assist	1 person + 1 person to manage equipment  · Weight-Bearing Restrictions	full weight-bearing  · Assistive Device	rolling walker  · Gait Distance	15 ft x 2    Gait Analysis:     · Gait Deviations Noted	decreased step length; decreased stride length  · Impairments Contributing to Gait Deviations	impaired balance; decreased flexibility; decreased strength    Stair Negotiation:     · Level of Zamora	unable to perform  6/1 OT  Bathing Training:     · Level of Zamora	moderate assist (50% patients effort)  · Physical Assist/Nonphysical Assist	1 person assist    Upper Body Dressing Training:     · Level of Zamora	moderate assist (50% patients effort); maximum assist (25% patients effort); to don gown  · Physical Assist/Nonphysical Assist	1 person assist    Lower Body Dressing Training:     · Level of Zamora	maximum assist (25% patients effort); to don socks  · Physical Assist/Nonphysical Assist	1 person assist    Toilet Hygiene Training:     · Level of Zamora	minimum assist (75% patients effort); for chapito anal hygiene for steadying  · Physical Assist/Nonphysical Assist	1 person assist    Grooming Training:     · Level of Zamora	minimum assist (75% patients effort)  · Physical Assist/Nonphysical Assist	1 person assist    Eating/Self-Feeding Training:     · Level of Zamora	not observed      ----------------------------------------------------------------------------------------  PHYSICAL EXAM  Constitutional - NAD, Appears Comfortable  HEENT - Right SD drain  Neck - Supple, No limited ROM  Chest - Breathing comfortably, No wheezing  Cardiovascular - S1S2   Abdomen - Soft   Extremities - No C/C/E, No calf tenderness   Neurologic Exam -                    Cognitive - AAO to self, place, date, year, situation     Communication - Fluent, No dysarthria     Cranial Nerves - CN 2-12 intact     FUNCTIONAL MOTOR EXAM -                      LEFT    UE - ShAB *1/5, EF 5/5, EE 5/5, WE 5/5,  5/5 *due to shoulder tears premorbid                    RIGHT UE - ShAB *2/5, EF 5/5, EE 5/5, WE 5/5,  5/5                    LEFT    LE - HF -/5, KE -/5, DF 5/5, PF 5/5                    RIGHT LE - HF -/5, KE -/5, DF 5/5, PF 5/5        Sensory - Intact to LT (some neuropathy - preexisting)  Psychiatric - Mood stable, Affect WNL, Perseverative  ----------------------------------------------------------------------------------------  ASSESSMENT/PLAN  79yMale with functional deficits after a fall sustaining a SDH  Traumatic right SDH s/p MMA embo/drain - Keppra  HTN - Hydralazine/Labetalol IV  CAD - Crestor  Pain - Tylenol, CONSIDER WARM COMPRESSES to face, TRAMADOL, CONSIDER  Neurontin 200mg Q8 (post-traumatic headaches/pre-existing neuropathy)  DVT PPX - SCDs, Lovenox  Rehab/Impaired mobility and function - Patient continues to require hospitalization for the above diagnoses and ongoing active management of comorbid complications that are substantially posing a threat to bodily function, functional ability and quality of life.     RECOMMEND - OOB daily     When medically optimized, based on the patient's diagnosis, current functional status and potential for progress, recommend ACUTE inpatient rehabilitation for the functional deficits consisting of 3 hours of therapy/day & 24 hour RN/daily PMR physician for active comorbid medical management. Patient will be able to tolerate 3 hours a day.

## 2025-06-02 NOTE — PROGRESS NOTE ADULT - ASSESSMENT
Assessment: 79yM  s/p fall Saturday 5/24 without LOC p/w with worsening gait instability and headaches (recent advil use). POD 2 MMA Embolization and percutaneous subdural drain placement    Plan:      -Discussed with Dr. Reid  -Neuro Checks Q4  -CTH 6/1 stable s/p drain removal, staples open to air   -Stat CTH for any change in neuro exam   -Keppra 500 BID x 7 days   --160, PRN hydralazine/ Labetalol   -Continue home rosuvastatin 10 mg   -RA  -Regular diet  -Last BM 6/1, continue miralax   -Voiding  -HX BPH: continue Flomax 0.4 qHS  -Pain control: Tylenol, Tramadol 25/50 PRN   -DVT ppx: SCD's, SQL   -PT/OT: recommending AR, pending auth

## 2025-06-03 ENCOUNTER — TRANSCRIPTION ENCOUNTER (OUTPATIENT)
Age: 80
End: 2025-06-03

## 2025-06-03 ENCOUNTER — RESULT REVIEW (OUTPATIENT)
Age: 80
End: 2025-06-03

## 2025-06-03 DIAGNOSIS — I49.9 CARDIAC ARRHYTHMIA, UNSPECIFIED: ICD-10-CM

## 2025-06-03 LAB
ANION GAP SERPL CALC-SCNC: 10 MMOL/L — SIGNIFICANT CHANGE UP (ref 5–17)
BUN SERPL-MCNC: 17.6 MG/DL — SIGNIFICANT CHANGE UP (ref 8–20)
CALCIUM SERPL-MCNC: 8.6 MG/DL — SIGNIFICANT CHANGE UP (ref 8.4–10.5)
CHLORIDE SERPL-SCNC: 103 MMOL/L — SIGNIFICANT CHANGE UP (ref 96–108)
CO2 SERPL-SCNC: 27 MMOL/L — SIGNIFICANT CHANGE UP (ref 22–29)
CREAT SERPL-MCNC: 0.83 MG/DL — SIGNIFICANT CHANGE UP (ref 0.5–1.3)
EGFR: 90 ML/MIN/1.73M2 — SIGNIFICANT CHANGE UP
EGFR: 90 ML/MIN/1.73M2 — SIGNIFICANT CHANGE UP
GLUCOSE SERPL-MCNC: 117 MG/DL — HIGH (ref 70–99)
HCT VFR BLD CALC: 36.5 % — LOW (ref 39–50)
HGB BLD-MCNC: 11.7 G/DL — LOW (ref 13–17)
MAGNESIUM SERPL-MCNC: 1.8 MG/DL — SIGNIFICANT CHANGE UP (ref 1.6–2.6)
MCHC RBC-ENTMCNC: 29.9 PG — SIGNIFICANT CHANGE UP (ref 27–34)
MCHC RBC-ENTMCNC: 32.1 G/DL — SIGNIFICANT CHANGE UP (ref 32–36)
MCV RBC AUTO: 93.4 FL — SIGNIFICANT CHANGE UP (ref 80–100)
NRBC # BLD AUTO: 0 K/UL — SIGNIFICANT CHANGE UP (ref 0–0)
NRBC # FLD: 0 K/UL — SIGNIFICANT CHANGE UP (ref 0–0)
NRBC BLD AUTO-RTO: 0 /100 WBCS — SIGNIFICANT CHANGE UP (ref 0–0)
PHOSPHATE SERPL-MCNC: 3.3 MG/DL — SIGNIFICANT CHANGE UP (ref 2.4–4.7)
PLATELET # BLD AUTO: 168 K/UL — SIGNIFICANT CHANGE UP (ref 150–400)
PMV BLD: 9.5 FL — SIGNIFICANT CHANGE UP (ref 7–13)
POTASSIUM SERPL-MCNC: 4.4 MMOL/L — SIGNIFICANT CHANGE UP (ref 3.5–5.3)
POTASSIUM SERPL-SCNC: 4.4 MMOL/L — SIGNIFICANT CHANGE UP (ref 3.5–5.3)
RBC # BLD: 3.91 M/UL — LOW (ref 4.2–5.8)
RBC # FLD: 13 % — SIGNIFICANT CHANGE UP (ref 10.3–14.5)
SODIUM SERPL-SCNC: 140 MMOL/L — SIGNIFICANT CHANGE UP (ref 135–145)
WBC # BLD: 3.95 K/UL — SIGNIFICANT CHANGE UP (ref 3.8–10.5)
WBC # FLD AUTO: 3.95 K/UL — SIGNIFICANT CHANGE UP (ref 3.8–10.5)

## 2025-06-03 PROCEDURE — 99291 CRITICAL CARE FIRST HOUR: CPT

## 2025-06-03 PROCEDURE — 93306 TTE W/DOPPLER COMPLETE: CPT | Mod: 26

## 2025-06-03 PROCEDURE — 99233 SBSQ HOSP IP/OBS HIGH 50: CPT

## 2025-06-03 PROCEDURE — 93010 ELECTROCARDIOGRAM REPORT: CPT

## 2025-06-03 RX ORDER — B1/B2/B3/B5/B6/B12/VIT C/FOLIC 500-0.5 MG
1 TABLET ORAL DAILY
Refills: 0 | Status: DISCONTINUED | OUTPATIENT
Start: 2025-06-03 | End: 2025-06-04

## 2025-06-03 RX ORDER — ACETAMINOPHEN 500 MG/5ML
1000 LIQUID (ML) ORAL ONCE
Refills: 0 | Status: COMPLETED | OUTPATIENT
Start: 2025-06-03 | End: 2025-06-03

## 2025-06-03 RX ADMIN — Medication 400 MILLIGRAM(S): at 12:16

## 2025-06-03 RX ADMIN — MONTELUKAST SODIUM 10 MILLIGRAM(S): 10 TABLET ORAL at 12:15

## 2025-06-03 RX ADMIN — LEVETIRACETAM 500 MILLIGRAM(S): 10 INJECTION, SOLUTION INTRAVENOUS at 06:09

## 2025-06-03 RX ADMIN — ENOXAPARIN SODIUM 40 MILLIGRAM(S): 100 INJECTION SUBCUTANEOUS at 22:07

## 2025-06-03 RX ADMIN — Medication 1 TABLET(S): at 12:15

## 2025-06-03 RX ADMIN — TRAMADOL HYDROCHLORIDE 25 MILLIGRAM(S): 50 TABLET, FILM COATED ORAL at 15:22

## 2025-06-03 RX ADMIN — POLYETHYLENE GLYCOL 3350 17 GRAM(S): 17 POWDER, FOR SOLUTION ORAL at 12:15

## 2025-06-03 RX ADMIN — ROSUVASTATIN CALCIUM 10 MILLIGRAM(S): 20 TABLET, FILM COATED ORAL at 22:03

## 2025-06-03 RX ADMIN — TRAMADOL HYDROCHLORIDE 25 MILLIGRAM(S): 50 TABLET, FILM COATED ORAL at 08:04

## 2025-06-03 RX ADMIN — Medication 1000 MILLIGRAM(S): at 13:00

## 2025-06-03 RX ADMIN — Medication 650 MILLIGRAM(S): at 22:03

## 2025-06-03 RX ADMIN — TRAMADOL HYDROCHLORIDE 25 MILLIGRAM(S): 50 TABLET, FILM COATED ORAL at 09:00

## 2025-06-03 RX ADMIN — Medication 650 MILLIGRAM(S): at 23:00

## 2025-06-03 RX ADMIN — TRAMADOL HYDROCHLORIDE 25 MILLIGRAM(S): 50 TABLET, FILM COATED ORAL at 16:00

## 2025-06-03 RX ADMIN — LEVETIRACETAM 500 MILLIGRAM(S): 10 INJECTION, SOLUTION INTRAVENOUS at 18:42

## 2025-06-03 RX ADMIN — TAMSULOSIN HYDROCHLORIDE 0.4 MILLIGRAM(S): 0.4 CAPSULE ORAL at 22:02

## 2025-06-03 NOTE — CHART NOTE - NSCHARTNOTEFT_GEN_A_CORE
Called by Neuro ICU RN about patient being in Afib and converting back and forth to Sinus Rhythm. Patient asymptomatic, HR 50's, hemodynamically stable. No dizziness, CP, SOB. EKG ordered, cardiology consult placed. Patient with extensive PMH including cardiac cauterization many years ago with 2 stents placed with remote history of afib seen on pulmonary testing.

## 2025-06-03 NOTE — DISCHARGE NOTE PROVIDER - CARE PROVIDER_API CALL
Jean Reid [facility]  Neurosurgery  [address] [place] [id number]  Phone: [phone]  Fax: [phone]  Follow Up Time: 2 weeks

## 2025-06-03 NOTE — PROVIDER CONTACT NOTE (OTHER) - ACTION/TREATMENT ORDERED:
Per neurosurgery will assess patient. EKG ordered and cardio consult placed by team. Per neurosurgery will assess patient.

## 2025-06-03 NOTE — PROGRESS NOTE ADULT - SUBJECTIVE AND OBJECTIVE BOX
CC: Patient being seen for rehabilitation follow up.  Patient feel well.  Wants to get up and walk.   Perseverative about walkers and the type the hospital has.     FUNCTIONAL PROGRESS  6/2 PT  Sit-Stand Transfer Training  Transfer Training Sit-to-Stand Transfer: moderate assist (50% patient effort);  1 person assist;  weight-bearing as tolerated   rolling walker  Transfer Training Stand-to-Sit Transfer: minimum assist (75% patient effort);  1 person assist;  weight-bearing as tolerated   rolling walker  Sit-to-Stand Transfer Training Transfer Safety Analysis: decreased balance;  decreased weight-shifting ability;  decreased strength;  impaired balance;  rolling walker    Gait Training  Gait Training: minimum assist (75% patient effort);  1 person assist;  weight-bearing as tolerated   rolling walker;  40 feet  Gait Analysis: 2-point gait   decreased nguyễn;  shuffling;  decreased step length;  (+) Right foot drop noted with compensatory steppage gait ;  impaired balance;  decreased strength;  impaired postural control    6/2 OT  Sit-Stand Transfer Training  Transfer Training Sit-to-Stand Transfer: moderate assist (50% patient effort);  1 person assist;  nonverbal cues (demo/gestures);  verbal cues;  weight-bearing as tolerated   rolling walker  Transfer Training Stand-to-Sit Transfer: minimum assist (75% patient effort);  1 person assist;  nonverbal cues (demo/gestures);  verbal cues;  weight-bearing as tolerated   rolling walker  Sit-to-Stand Transfer Training Transfer Safety Analysis: decreased weight-shifting ability;  decreased strength;  impaired balance;  impaired postural control;  pain;  rolling walker    Functional Endurance  Functional Endurance Detail: Pt ambulated with RW and min A x 1, +external cues short distances around bed area due to decreased balance and strength. Pt educated in energy conservation techniques including proper breathing and activity pacing.     Lower Body Dressing Training  Lower Body Dressing Training Assistance: maximum assist (25% patient effort);  decreased flexibility;  decreased strength;  impaired balance;  pain      VITALS  T(C): 36.3 (06-03-25 @ 04:17), Max: 36.7 (06-02-25 @ 11:26)  HR: 66 (06-03-25 @ 04:30) (44 - 88)  BP: 135/85 (06-03-25 @ 04:30) (130/99 - 146/68)  RR: 18 (06-03-25 @ 04:30) (15 - 21)  SpO2: 99% (06-03-25 @ 04:30) (94% - 100%)  Wt(kg): --    MEDICATIONS   acetaminophen     Tablet .. 650 milliGRAM(s) every 6 hours PRN  enoxaparin Injectable 40 milliGRAM(s) every 24 hours  hydrALAZINE Injectable 10 milliGRAM(s) every 4 hours PRN  labetalol Injectable 10 milliGRAM(s) every 4 hours PRN  levETIRAcetam 500 milliGRAM(s) two times a day  montelukast 10 milliGRAM(s) daily  multivitamin 1 Tablet(s) daily  polyethylene glycol 3350 17 Gram(s) daily  rosuvastatin 10 milliGRAM(s) at bedtime  tamsulosin 0.4 milliGRAM(s) at bedtime  traMADol 25 milliGRAM(s) every 6 hours PRN      RECENT LABS/IMAGING  - Reviewed Today                        11.7   3.95  )-----------( 168      ( 03 Jun 2025 01:30 )             36.5     06-03    140  |  103  |  17.6  ----------------------------<  117[H]  4.4   |  27.0  |  0.83    Ca    8.6      03 Jun 2025 01:30  Phos  3.3     06-03  Mg     1.8     06-03        Urinalysis Basic - ( 03 Jun 2025 01:30 )    Color: x / Appearance: x / SG: x / pH: x  Gluc: 117 mg/dL / Ketone: x  / Bili: x / Urobili: x   Blood: x / Protein: x / Nitrite: x   Leuk Esterase: x / RBC: x / WBC x   Sq Epi: x / Non Sq Epi: x / Bacteria: x            HEAD CT 5/30 -  Moderate to large acute on subacute subdural hematoma right cerebral convexity with associated mass effect..    CERVICAL SPINE CT  5/30 - No evidence of an acute cervical spine fracture.    HEAD CT 6/1 - Right frontal shunt catheter has been removed, the rest of   this study appears stable.      ----------------------------------------------------------------------------------------  PHYSICAL EXAM  Constitutional - NAD, Appears Comfortable  HEENT - Right Darlin - CDI  Extremities - No C/C/E, No calf tenderness   Neurologic Exam -                    Cognitive - AAO to self, place, date, year, situation     Communication - Fluent, No dysarthria     Cranial Nerves - CN 2-12 intact     FUNCTIONAL MOTOR EXAM -                      LEFT    UE - ShAB *1/5, EF 5/5, EE 5/5, WE 5/5,  5/5 *due to shoulder tears premorbid                    RIGHT UE - ShAB *2/5, EF 5/5, EE 5/5, WE 5/5,  5/5                    LEFT    LE - HF -/5, KE -/5, DF 5/5, PF 5/5                    RIGHT LE - HF -/5, KE -/5, DF 5/5, PF 5/5        Sensory - Intact to LT (some neuropathy - preexisting)  Psychiatric - Calm, Perseverative  ----------------------------------------------------------------------------------------  ASSESSMENT/PLAN  79yMale with functional deficits after a fall sustaining a SDH  Traumatic right SDH s/p MMA embo/drain - Keppra  HTN - Hydralazine/Labetalol IV  CAD - Crestor  Pain - Tylenol, CONSIDER WARM COMPRESSES to face, TRAMADOL, CONSIDER  Neurontin 300mg HS (post-traumatic headaches/pre-existing neuropathy)  DVT PPX - SCDs, Lovenox  Rehab/Impaired mobility and function - Patient continues to require hospitalization for the above diagnoses and ongoing active management of comorbid complications that are substantially posing a threat to bodily function, functional ability and quality of life.     RECOMMEND - OOB daily     When medically optimized, based on the patient's diagnosis, current functional status and potential for progress, recommend ACUTE inpatient rehabilitation for the functional deficits consisting of 3 hours of therapy/day & 24 hour RN/daily PMR physician for active comorbid medical management. Patient will be able to tolerate 3 hours a day.    Will need PT and OT within 48hrs AR admission.

## 2025-06-03 NOTE — CONSULT NOTE ADULT - ASSESSMENT
79yM PMH HLD, HTN, prostate CA, seasonal allergies, BPH, cardiac catheterization many years ago s/p 2 stents at [facility],  originally on Plavix but then stopped 2/2 ulcerative colitis and never went on ASA, h/o prostate CA, lung CA, melanoma s/p resection, remote history of afib after pulmonary testing several years ago originally on Eliquis but it flared up his ulcerative colitis so came off of it and hasn't heard about arrhythmia since then, squamous cell CA s/p radiation, h/o ACDF 1996, h/o anterior approach lumbar fusion in [place], [place] 1 year ago 5/28/24, now presents to ED s/p fall Saturday 5/24 without LOC, since fall has had difficulty walking and headaches unrelieved (at worst 5/10, currently 3/10) despite taking Tylenol/Advil.    Patient admitted for HEAD CT:  Moderate to large acute on subacute subdural hematoma right cerebral convexity with associated mass effect.  Patient underwent MMA embolization.  A consult was placed due to an arrythmia noted on the monitor with a concern for Afib.   Patient examined bedside in Neuro ICU and currently dizziness, headache chest pain, palpitations sob, pnd, orthopnea, hemoptysis, n/v/d/abd pain, cramps, urianry discomfort, fever, chills, or any other discomfort.  
79yM PMH HLD, HTN, prostate CA, seasonal allergies, BPH, cardiac catheterization many years ago s/p 2 stents originally on Plavix but then stopped 2/2 ulcerative colitis and never went on ASA, h/o prostate CA, lung CA, melanoma s/p resection, remote history of afib not on anticoagulation.    Patient presented to ED accompanied by his wife after a fall 6 days ago off of a curb. Patient states that he has some difficulty walking after recent back surgery but walks unassisted. He states that he felt in his usual states of health prior to the incidient. He was able to walk and drive home thereafter however, he felt "sore" with constant headache and weakness in the days after. Denies nausea or emesis. CTH significant for large SDH. Patient not maintained on anticoagulation. No other traumatic injuries identified. Complaining of right hip pain at time of trauma evaluation.    recommendations:  - No further trauma surgery intervention  - Remainder of management per neurosurgery  - Please reconsult if needed    Patient discussed with Dr. Menchaca
79yM extensive PMH including 2 cardiac stents several years ago originally on Plavix but then stopped 2/2 ulcerative colitis and never went on ASA, remote history of afib after pulmonary testing several years ago originally on Eliquis but it flared up his ulcerative colitis so came off of it and hasn't heard about arrhythmia since then, h/o anterior approach lumbar fusion in [place], [place] 1 year ago 5/28/24, now presents to ED s/p fall Saturday 5/24 without LOC, since fall has had difficulty walking and headaches unrelieved (at worst 5/10, currently 3/10) despite taking Tylenol/Advil.  - CTH with 2.4 cm acute on chronic SDH with mild mass effect  - No evidence of focal neurologic deficit    PLAN:  - D/w Dr. Reid  - Q1 neuro checks, HOB 30 degrees  - Repeat CTH 6 hours from original, ~ 22:00, STAT with any change in mental status  - Keppra 500 Q12  - Trauma evaluation- if no other injuries, admit to NSICU  - Tentatively planned for MMA embolization in AM with Dr. Reid and possible twist drill SDH evacuation post embolizaton  - Preop for procedures in AM- basic labs pending + CXR + EKG  - Normotensive BP goals, SBP < 160  - Hold ACT/APT  - Supportive care/further medical management per NSICU vs SICU    **55 minutes time spent evaluating/treating patient with medical issues and reviewing data including but limited to vital signs/labs/imaging/imaging results, clinical examination, discussion of treatment/plan with attending, multidisciplinary team and consultants, discussing plan/goals of care with patient/family

## 2025-06-03 NOTE — CONSULT NOTE ADULT - PROBLEM SELECTOR RECOMMENDATION 9
79yo, admitted for SDH, underwent MMA embolization.  S/P Fall - does not remember events of the fall - may need syncopy work up  States history of CAD with stents but unable to tolerate DAPT due to Ulcerative Colitis  States history of afib and also was unable to tolerate AC due to Ulcerative colitis  Strips reviewed underlying SR with multiple PAC"s, Junctional beats and pauses.  Pauses were note to range mostly about 2 seconds, longest was 2.57 seconds long  Discussed options - at this point patient is not interested in any more surgery or PPM  Patient expressed interest in MCOT or halter device outpatient  Ct to follow up on telemetry  12 lead EKG with SR, junctional beats, and short NH.   Outpatient MCOT  TTE this admission 79yo, admitted for SDH, underwent MMA embolization.  S/P Fall - does not remember events of the fall - may need syncopy work up  States history of CAD with stents but unable to tolerate DAPT due to Ulcerative Colitis  States history of afib and also was unable to tolerate AC due to Ulcerative colitis  Strips reviewed underlying SR with multiple PAC"s, Junctional beats and pauses.  Pauses were note to range mostly about 2 seconds, longest was 2.57 seconds long  Discussed options - at this point patient is not interested in any more surgery or PPM  Patient expressed interest in MCOT or halter device outpatient  Ct to follow up on telemetry  12 lead EKG with SR, junctional beats, and short IN.   Outpatient MCOT  TTE this admission  Hold all AV bradford blockers

## 2025-06-03 NOTE — DISCHARGE NOTE PROVIDER - NSDCCPTREATMENT_GEN_ALL_CORE_FT
PRINCIPAL PROCEDURE  Procedure: Angiogram, cerebral, with embolization  Findings and Treatment: Right Middle Meningeal Artery and Percutaneous Right SDH evacuation

## 2025-06-03 NOTE — CONSULT NOTE ADULT - NS ATTEND AMEND GEN_ALL_CORE FT
79yM PMH HLD, HTN, prostate CA, seasonal allergies, BPH, cardiac catheterization many years ago s/p 2 stents at [facility],  originally on Plavix but then stopped 2/2 ulcerative colitis and never went on ASA, h/o prostate CA, lung CA, melanoma s/p resection, remote history of afib after pulmonary testing several years ago originally on Eliquis but it flared up his ulcerative colitis so came off of it and hasn't heard about arrhythmia since then, squamous cell CA s/p radiation, h/o ACDF 1996, h/o anterior approach lumbar fusion in [place], [place] 1 year ago 5/28/24, now presents to ED s/p fall Saturday 5/24 without LOC, since fall has had difficulty walking and headaches unrelieved (at worst 5/10, currently 3/10) despite taking Tylenol/Advil.    Patient admitted for HEAD CT:  Moderate to large acute on subacute subdural hematoma right cerebral convexity with associated mass effect.  Patient underwent MMA embolization.  A consult was placed due to an arrythmia noted on the monitor with a concern for Afib.       Sinus rhythm  Sinus pause 2.5 seconds longest  Premature atrial contractions  CAD with hx of PCI  Hyperlipidemia  Hypertension  remote history of atrial fibrillation      Telemetry review: Sinus rhythm with PACS, there are pauses of up to 2.5 seconds, patient is awake during episodes and asymptomatic, watching TV.    He reports a remote history of atrial fibrillation and hx of PCI     We discussed evaluation for Sinus with PAC's and further evaluation if he were to go in atrial fibrillation with 2d TTE and MCOT device  he is not interested in any invasive procedures or interventions at this time from the cardiovascular perspective understanding risks/benefits/alternatives and rationale.    Recommend continued monitoring, hold AV bradford agents and would need EP evaluation as outpatient if he were to agree long term (which he does not at this time).

## 2025-06-03 NOTE — DISCHARGE NOTE PROVIDER - NSDCCPCAREPLAN_GEN_ALL_CORE_FT
PRINCIPAL DISCHARGE DIAGNOSIS  Diagnosis: Subdural hematoma  Assessment and Plan of Treatment:      Patient is a 29y old  Female , gestational HTN, S/p  on  and presents to [facility] last week with complaining of progressive BEATTY and BLE swelling. 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 HF team was consulted and GMDT regimen started with outpatient HF follow up. EP is consulted for life vest (versus Jewel External patch defibrillator).    - Given new peripartum cardiomyopathy, life vest discussed with patient, all risk, benefit and alternative discussed at length, all questions and concerns addressed. pt. voiced understanding and states she will make decision after she discusses with her family Lasix 20 mg iv qd.  Start enalapril 5 mg po qd.  Start Coreg 3.125 mg po bid. PRINCIPAL DISCHARGE DIAGNOSIS  Diagnosis: Subdural hematoma  Assessment and Plan of Treatment: Pain: It is common to have a headache. You may take the pain medication we prescribe, or over the counter Tylenol. Pain medication, especially narcotics, can cause constipation. Use stool softeners or mild laxative as needed.   Activity: Avoid straining, heavy lifting (objects greater than 10 pounds), strenuous activity, twisting, bending, and vigorous exercise. You should not drive or operate machinery until cleared by your neurosurgeon.   Keppra 500 mg q 12 should be discontinued on 6/8/2025  Appointment(s): Follow up with  in our office outpatient in 2 weeks. Call [phone] to schedule an appointment.   Should you develop any new or worsening neurologic symptoms or have concern about your incision, please call our office immediately or visit the emergency department.  Return to ER immediately for any of the following: fever, bleeding, new onset numbness/tingling/weakness, nausea and/or vomiting, chest pain, shortness of breath, confusion, seizure, altered mental status, urinary and/or fecal incontinence or retention.

## 2025-06-03 NOTE — PROVIDER CONTACT NOTE (OTHER) - ASSESSMENT
Pt noted to in afib on the monitor. Intermittently switching back and forth from afib to NSR on the monitor. HR controlled. Per patient symptomatic. Pt noted to in afib on the monitor. Intermittently switching back and forth from afib to NSR on the monitor. HR controlled. Per patient Asymptomatic.

## 2025-06-03 NOTE — PROVIDER CONTACT NOTE (OTHER) - REASON
Noted to be in AFIB on the monitor; patient transitioning from AFIB to NSR frequently. HR controlled 45-85 BPM

## 2025-06-03 NOTE — DISCHARGE NOTE PROVIDER - NSDCDCMDCOMP_GEN_ALL_CORE
bilateral upper extremity Passive ROM was WFL (within functional limits)/bilateral lower extremity Passive ROM was WFL (within functional limits)
This document is complete and the patient is ready for discharge.

## 2025-06-03 NOTE — PROGRESS NOTE ADULT - SUBJECTIVE AND OBJECTIVE BOX
HPI:  79yM PMH HLD, HTN, prostate CA, seasonal allergies, BPH, cardiac catheterization many years ago s/p 2 stents originally on Plavix but then stopped 2/2 ulcerative colitis and never went on ASA, h/o prostate CA, lung CA, melanoma s/p resection, remote history of afib after pulmonary testing several years ago originally on Eliquis but it flared up his ulcerative colitis so came off of it and hasn't heard about arrhythmia since then, squamous cell CA s/p radiation, h/o ACDF 1996, h/o anterior approach lumbar fusion in [place], [place] 1 year ago 5/28/24, now presents to ED s/p fall Saturday 5/24 without LOC, since fall has had difficulty walking and headaches unrelieved (at worst 5/10, currently 3/10) despite taking Tylenol/Advil. Attests to mild RLQ abdominal pain since he has for several months now. Denies dizziness, nausea, vomiting, chest pain, palpitations, SOB, numbness, tingling, new weakness.      INTERVAL HPI/OVERNIGHT EVENTS:  79y Male s/p __ seen lying comfortably in bed. Tolerating diet. Passing gas/BM. Voiding. Scott in with __ clear urine. Denies headache, weakness, numbness, n/v/d, fevers, chills, chest pain, SOB.     Vital Signs Last 24 Hrs  T(C): 36.3 (03 Jun 2025 04:17), Max: 36.7 (02 Jun 2025 11:26)  T(F): 97.3 (03 Jun 2025 04:17), Max: 98 (02 Jun 2025 11:26)  HR: 66 (03 Jun 2025 04:30) (44 - 88)  BP: 135/85 (03 Jun 2025 04:30) (130/99 - 146/68)  BP(mean): 95 (03 Jun 2025 04:30) (85 - 106)  RR: 18 (03 Jun 2025 04:30) (15 - 21)  SpO2: 99% (03 Jun 2025 04:30) (94% - 100%)    Parameters below as of 03 Jun 2025 04:30  Patient On (Oxygen Delivery Method): room air    PHYSICAL EXAM:  GENERAL: NAD, well-groomed  HEAD:  Atraumatic, normocephalic  DRAINS:   WOUND: Dressing clean dry intact  COURTNEY COMA SCORE: E- V- M- =       E: 4= opens eyes spontaneously 3= to voice 2= to noxious 1= no opening       V: 5= oriented 4= confused 3= inappropriate words 2= incomprehensible sounds 1= nonverbal 1T= intubated       M: 6= follows commands 5= localizes 4= withdraws 3= flexor posturing 2= extensor posturing 1= no movement  MENTAL STATUS: AAO x3; Awake/Comatose; Opens eyes spontaneously/to voice/to light touch/to noxious stimuli; Appropriately conversant without aphasia/Nonverbal; following simple commands/mimicking/not following commands  CRANIAL NERVES: Visual acuity normal for age, visual fields full to confrontation, PERRL. EOMI without nystagmus. Facial sensation intact V1-3 distribution b/l. Face symmetric w/ normal eye closure and smile, tongue midline. Hearing grossly intact. Speech clear. Head turning and shoulder shrug intact.   REFLEXES: PERRL. Corneals intact b/l. Gag intact. Cough intact. Oculocephalic reflex intact (Doll's eye). Negative Rahman's b/l. Negative clonus b/l  MOTOR: strength 5/5 b/l upper and lower extremities  Uppers     Delt (C5/6)     Bicep (C5/6)     Wrist Extend (C6)     Tricep (C7)     HG (C8/T1)  R                     5/5                 5/5                         5/5                           5/5                   5/5  L                      5/5                 5/5                         5/5                           5/5                   5/5  Lowers      HF(L1/L2)     KE (L3)     DF (L4)     EHL (L5)     PF (S1)      R                     5/5              5/5           5/5           5/5            5/5  L                     5/5               5/5          5/5            5/5            5/5  SENSATION: grossly intact to light touch all extremities  COORDINATION: Gait intact; rapid alternating movements intact; heel to shin intact; no upper extremity dysmetria  CHEST/LUNG: Clear to auscultation bilaterally; no rales, rhonchi, wheezing, or rubs  HEART: +S1/+S2; Regular rate and rhythm; no murmurs, rubs, or gallops  ABDOMEN: Soft, nontender, nondistended; bowel sounds present all four quadrants  EXTREMITIES:  2+ peripheral pulses, no clubbing, cyanosis, or edema  SKIN: Warm, dry; no rashes or lesions    LABS:                        11.7   3.95  )-----------( 168      ( 03 Jun 2025 01:30 )             36.5     06-03    140  |  103  |  17.6  ----------------------------<  117[H]  4.4   |  27.0  |  0.83    Ca    8.6      03 Jun 2025 01:30  Phos  3.3     06-03  Mg     1.8     06-03    Urinalysis Basic - ( 03 Jun 2025 01:30 )  Color: x / Appearance: x / SG: x / pH: x  Gluc: 117 mg/dL / Ketone: x  / Bili: x / Urobili: x   Blood: x / Protein: x / Nitrite: x   Leuk Esterase: x / RBC: x / WBC x   Sq Epi: x / Non Sq Epi: x / Bacteria: x    I&O:   06-02 @ 07:01  -  06-03 @ 07:00  --------------------------------------------------------  IN: 960 mL / OUT: 0 mL / NET: 960 mL    MEDICATIONS:   MEDICATIONS  (STANDING):  enoxaparin Injectable 40 milliGRAM(s) SubCutaneous every 24 hours  levETIRAcetam 500 milliGRAM(s) Oral two times a day  montelukast 10 milliGRAM(s) Oral daily  multivitamin 1 Tablet(s) Oral daily  polyethylene glycol 3350 17 Gram(s) Oral daily  rosuvastatin 10 milliGRAM(s) Oral at bedtime  tamsulosin 0.4 milliGRAM(s) Oral at bedtime    MEDICATIONS  (PRN):  acetaminophen     Tablet .. 650 milliGRAM(s) Oral every 6 hours PRN Temp greater or equal to 38C (100.4F), Mild Pain (1 - 3)  hydrALAZINE Injectable 10 milliGRAM(s) IV Push every 4 hours PRN SBP > 160  labetalol Injectable 10 milliGRAM(s) IV Push every 4 hours PRN SBP > 160  traMADol 25 milliGRAM(s) Oral every 6 hours PRN Moderate Pain (4 - 6)      RADIOLOGY & ADDITIONAL TESTS:  < from: CT Head No Cont (06.01.25 @ 13:07) >    IMPRESSION: Right frontal shunt catheter has been removed, the rest of   this study appears stable.    --- End of Report ---    JOHN ZHANG MD; Attending Radiologist  This document has been electronically signed. Jun 1 2025  3:26    < end of copied text >     HPI:  79yM PMH HLD, HTN, prostate CA, seasonal allergies, BPH, cardiac catheterization many years ago s/p 2 stents originally on Plavix but then stopped 2/2 ulcerative colitis and never went on ASA, h/o prostate CA, lung CA, melanoma s/p resection, remote history of afib after pulmonary testing several years ago originally on Eliquis but it flared up his ulcerative colitis so came off of it and hasn't heard about arrhythmia since then, squamous cell CA s/p radiation, h/o ACDF 1996, h/o anterior approach lumbar fusion in [place], [place] 1 year ago 5/28/24, now presents to ED s/p fall Saturday 5/24 without LOC, since fall has had difficulty walking and headaches unrelieved (at worst 5/10, currently 3/10) despite taking Tylenol/Advil. Attests to mild RLQ abdominal pain since he has for several months now. Denies dizziness, nausea, vomiting, chest pain, palpitations, SOB, numbness, tingling, new weakness.      INTERVAL HPI/OVERNIGHT EVENTS:  79y Male s/p R MMA embolization and percutaneous subdural drain placement with evacuation on 5/31 with Dr. Reid, pt seen by neurosurgery team, sitting up in bed eating breakfast, Tolerating diet. Last BM 6/3. Patient reports headache but relieved with tramadol and tylenol. Spoke with patient about adding Gabapentin for headaches but does not want to take at this time feels the tramadol is working well. Awaiting authorization for AR.     Vital Signs Last 24 Hrs  T(C): 36.3 (03 Jun 2025 04:17), Max: 36.7 (02 Jun 2025 11:26)  T(F): 97.3 (03 Jun 2025 04:17), Max: 98 (02 Jun 2025 11:26)  HR: 66 (03 Jun 2025 04:30) (44 - 88)  BP: 135/85 (03 Jun 2025 04:30) (130/99 - 146/68)  BP(mean): 95 (03 Jun 2025 04:30) (85 - 106)  RR: 18 (03 Jun 2025 04:30) (15 - 21)  SpO2: 99% (03 Jun 2025 04:30) (94% - 100%)    Parameters below as of 03 Jun 2025 04:30  Patient On (Oxygen Delivery Method): room air    PHYSICAL EXAM:  GENERAL: NAD, well-groomed  HEAD:  s/p R percutaneous SDH evac site with staples open to air, C/D/I   COURTNEY COMA SCORE: E- V- M- = 15  MENTAL STATUS: AAO x3; Awake, Opens eyes spontaneously, Appropriately conversant without aphasia, following simple commands  CRANIAL NERVES: PERRL. EOMI without nystagmus. Face symmetric w/ normal eye closure and smile, tongue midline. Hearing grossly intact. Speech clear. Head turning and shoulder shrug intact.   MOTOR: strength 5/5 b/l upper and lower extremities  SENSATION: grossly intact to light touch all extremities  CHEST/LUNG: non-labored breathing   ABDOMEN: Soft, nontender, nondistended  SKIN: Warm, dry    LABS:                        11.7   3.95  )-----------( 168      ( 03 Jun 2025 01:30 )             36.5     06-03    140  |  103  |  17.6  ----------------------------<  117[H]  4.4   |  27.0  |  0.83    Ca    8.6      03 Jun 2025 01:30  Phos  3.3     06-03  Mg     1.8     06-03    Urinalysis Basic - ( 03 Jun 2025 01:30 )  Color: x / Appearance: x / SG: x / pH: x  Gluc: 117 mg/dL / Ketone: x  / Bili: x / Urobili: x   Blood: x / Protein: x / Nitrite: x   Leuk Esterase: x / RBC: x / WBC x   Sq Epi: x / Non Sq Epi: x / Bacteria: x    I&O:   06-02 @ 07:01  -  06-03 @ 07:00  --------------------------------------------------------  IN: 960 mL / OUT: 0 mL / NET: 960 mL    MEDICATIONS:   MEDICATIONS  (STANDING):  enoxaparin Injectable 40 milliGRAM(s) SubCutaneous every 24 hours  levETIRAcetam 500 milliGRAM(s) Oral two times a day  montelukast 10 milliGRAM(s) Oral daily  multivitamin 1 Tablet(s) Oral daily  polyethylene glycol 3350 17 Gram(s) Oral daily  rosuvastatin 10 milliGRAM(s) Oral at bedtime  tamsulosin 0.4 milliGRAM(s) Oral at bedtime    MEDICATIONS  (PRN):  acetaminophen     Tablet .. 650 milliGRAM(s) Oral every 6 hours PRN Temp greater or equal to 38C (100.4F), Mild Pain (1 - 3)  hydrALAZINE Injectable 10 milliGRAM(s) IV Push every 4 hours PRN SBP > 160  labetalol Injectable 10 milliGRAM(s) IV Push every 4 hours PRN SBP > 160  traMADol 25 milliGRAM(s) Oral every 6 hours PRN Moderate Pain (4 - 6)      RADIOLOGY & ADDITIONAL TESTS:  < from: CT Head No Cont (06.01.25 @ 13:07) >    IMPRESSION: Right frontal shunt catheter has been removed, the rest of   this study appears stable.    --- End of Report ---    JOHN ZHANG MD; Attending Radiologist  This document has been electronically signed. Jun 1 2025  3:26    < end of copied text >

## 2025-06-03 NOTE — CONSULT NOTE ADULT - SUBJECTIVE AND OBJECTIVE BOX
[facility]                                              CARDIOLOGY AT [facility]                                                   [address], [place] [address]                                             Telephone: [phone]. Fax:[phone]                                                       CARDIOLOGY CONSULTATION NOTE                                                                                             History obtained by: Patient and medical record  Community Cardiologist:   Unknown    obtained: Yes [  ] No [  ]  Reason for Consultation:   Available out pt records reviewed: Yes [  ] No [  ]    Chief complaint:    Patient is a 79y old  Male who presents with a chief complaint of SDH.        HPI:  79yM PMH HLD, HTN, prostate CA, seasonal allergies, BPH, cardiac catheterization many years ago s/p 2 stents at [facility],  originally on Plavix but then stopped 2/2 ulcerative colitis and never went on ASA, h/o prostate CA, lung CA, melanoma s/p resection, remote history of afib after pulmonary testing several years ago originally on Eliquis but it flared up his ulcerative colitis so came off of it and hasn't heard about arrhythmia since then, squamous cell CA s/p radiation, h/o ACDF 1996, h/o anterior approach lumbar fusion in [place], [place] 1 year ago 5/28/24, now presents to ED s/p fall Saturday 5/24 without LOC, since fall has had difficulty walking and headaches unrelieved (at worst 5/10, currently 3/10) despite taking Tylenol/Advil.    Patient admitted for HEAD CT:  Moderate to large acute on subacute subdural hematoma right cerebral convexity with associated mass effect.  Patient underwent MMA embolization.  A consult was placed due to an arrythmia noted on the monitor with a concern for Afib.   Patient examined bedside in Neuro ICU and currently dizziness, headache chest pain, palpitations sob, pnd, orthopnea, hemoptysis, n/v/d/abd pain, cramps, urianry discomfort, fever, chills, or any other discomfort.      PAST MEDICAL & SURGICAL HISTORY:  Hyperlipidemia  Hypertension  Prostate CA  Seasonal allergies  BPH (benign prostatic hyperplasia)  Ulcerative colitis  H/O cardiac catheterization s/p 2 stents, originally was on Plavix then removed after developing ulcerative colitis  Cervical vertebral fusion surgery 1996  H/O hernia repair left and right inguinal in 2019  and umbilical repair  Status post correction of deviated nasal septum 1970s  Anterior approached lumbar fusion 5/28/24 in [place], [place] by Dr. Hunter  Lung cancer s/p nodule resection  Melanoma s/p resection  Squamous cell cancer s/p radiation    FAMILY HISTORY:  FH: prostate cancer (Sibling)    SOCIAL HISTORY:  Tobacco Use: Past smoker, smoked teenager until 40 y/o at 3PPD  EtOH use: Drinks a glass of wine daily with dinner  Substance: Denies    Allergies  Levaquin (Swelling)  Eliquis (exacerbates ulcerative colitis)    REVIEW OF SYSTEMS  As noted in HPI    HOME MEDICATIONS:  Home Medications:  Cialis 10 mg oral tablet: 1 tab(s) orally once a day PM  Singulair 10 mg oral tablet: 1 tab(s) orally once a day  Skyrizi infusions    MEDICATIONS:  Antibiotics:    Neuro:    Anticoagulation:    OTHER:    IVF:    Vital Signs Last 24 Hrs  T(C): 36.7 (30 May 2025 11:56), Max: 36.7 (30 May 2025 11:56)  T(F): 98 (30 May 2025 11:56), Max: 98 (30 May 2025 11:56)  HR: 71 (30 May 2025 11:56) (71 - 80)  BP: 131/67 (30 May 2025 11:56) (112/66 - 131/67)  BP(mean): --  RR: 20 (30 May 2025 11:56) (20 - 20)  SpO2: 98% (30 May 2025 11:56) (98% - 98%)    Parameters below as of 30 May 2025 11:56  Patient On (Oxygen Delivery Method): room air    PHYSICAL EXAM:  GENERAL: NAD, well-groomed  HEAD: Atraumatic, normocephalic  COURTNEY COMA SCORE: E- 4 V- 5 M- 6=15  MENTAL STATUS: AAO x3; Appropriately conversant without aphasia; following commands  CRANIAL NERVES: PERRL. EOMI without nystagmus. Facial sensation intact V1-3 distribution b/l. Face symmetric w/ normal eye closure and smile, tongue midline. Hearing grossly intact. Speech clear  MOTOR: strength 5/5 upper extremities  SENSATION: grossly intact to light touch all extremities  CHEST/LUNG: Nonlabored  HEART: regular rate  ABDOMEN: mild RLQ/flank pain    LABS:      RADIOLOGY & ADDITIONAL STUDIES:  CT Cervical Spine No Cont (05.30.25 @ 16:08)  IMPRESSION:  HEAD CT:  Moderate to large acute on subacute subdural hematoma right   cerebral convexity with associated mass effect..  CERVICAL SPINE CT:  No evidence of an acute cervical spine fracture.   (30 May 2025 18:23)      CARDIAC TESTING   ECHO:    STRESS:    CATH:     ELECTROPHYSIOLOGY:     PAST MEDICAL HISTORY  Hyperlipidemia    Hypertension    Prostate CA    Seasonal allergies    BPH (benign prostatic hyperplasia)    Ulcerative colitis        PAST SURGICAL HISTORY  H/O cardiac catheterization    Cervical vertebral fusion    H/O hernia repair    Status post correction of deviated nasal septum        SOCIAL HISTORY:  Denies smoking/alcohol/drugs  CIGARETTES:     ALCOHOL:  DRUGS:    FAMILY HISTORY:  FH: prostate cancer (Sibling)      Family History of Cardiovascular Disease:  Yes [  ] No [  ]  Coronary Artery Disease in first degree relative: Yes [  ] No [  ]  Sudden Cardiac Death in First degree relative: Yes [  ] No [  ]    HOME MEDICATIONS:  Asacol 400 mg oral delayed release tablet: 2  orally 3 times a day (11 May 2022 06:47)  Cialis 10 mg oral tablet: 1 tab(s) orally once a day PM (11 May 2022 06:47)  Crestor 10 mg oral tablet: 1 tab(s) orally once a day (11 May 2022 06:47)  Norvasc 5 mg oral tablet: 1 tab(s) orally once a day (11 May 2022 06:47)  Singulair 10 mg oral tablet: 1 tab(s) orally once a day (11 May 2022 06:47)  Tylenol 325 mg oral tablet: 2 tab(s) orally every 4 hours, As Needed (11 May 2022 06:47)  Visine 0.05% ophthalmic solution: 2 drop(s) to each affected eye once a day (11 May 2022 06:47)      CURRENT CARDIAC MEDICATIONS:  hydrALAZINE Injectable 10 milliGRAM(s) IV Push every 4 hours PRN SBP > 160  labetalol Injectable 10 milliGRAM(s) IV Push every 4 hours PRN SBP > 160      CURRENT OTHER MEDICATIONS:  montelukast 10 milliGRAM(s) Oral daily  acetaminophen     Tablet .. 650 milliGRAM(s) Oral every 6 hours PRN Temp greater or equal to 38C (100.4F), Mild Pain (1 - 3)  levETIRAcetam 500 milliGRAM(s) Oral two times a day  traMADol 25 milliGRAM(s) Oral every 6 hours PRN Moderate Pain (4 - 6)  polyethylene glycol 3350 17 Gram(s) Oral daily  enoxaparin Injectable 40 milliGRAM(s) SubCutaneous every 24 hours  multivitamin 1 Tablet(s) Oral daily  rosuvastatin 10 milliGRAM(s) Oral at bedtime  tamsulosin 0.4 milliGRAM(s) Oral at bedtime      ALLERGIES:   Levaquin (Swelling)      REVIEW OF SYMPTOMS:   CONSTITUTIONAL: No fever, no chills, no weight loss, no weight gain, no fatigue   ENMT:  No vertigo; No sinus or throat pain  NECK: No pain or stiffness  CARDIOVASCULAR: No chest pain, no dyspnea, no syncope/presyncope, no palpitations, no dizziness, no Orthopnea, no Paroxsymal nocturnal dyspnea  RESPIRATORY: no Shortness of breath, no cough, no wheezing  : No dysuria, no hematuria   GI: No dark color stool, no nausea, no diarrhea, no constipation, no abdominal pain   NEURO: No headache, no slurred speech   MUSCULOSKELETAL: No joint pain or swelling; No muscle, back, or extremity pain  PSYCH: No agitation, no anxiety.    ALL OTHER REVIEW OF SYSTEMS ARE NEGATIVE.    VITAL SIGNS:  T(C): 36.7 (06-03-25 @ 08:07), Max: 36.7 (06-02-25 @ 16:42)  T(F): 98 (06-03-25 @ 08:07), Max: 98 (06-02-25 @ 16:42)  HR: 64 (06-03-25 @ 09:03) (44 - 80)  BP: 126/70 (06-03-25 @ 08:00) (126/70 - 136/63)  RR: 14 (06-03-25 @ 09:03) (14 - 21)  SpO2: 96% (06-03-25 @ 09:03) (95% - 100%)    INTAKE AND OUTPUT:     06-02 @ 07:01  -  06-03 @ 07:00  --------------------------------------------------------  IN: 960 mL / OUT: 0 mL / NET: 960 mL        PHYSICAL EXAM:  Constitutional: Comfortable . No acute distress.   HEENT: Atraumatic and normocephalic , neck is supple . no JVD. No carotid bruit.  CNS: A&Ox3. No focal deficits.   Respiratory: CTAB, unlabored   Cardiovascular: RRR normal s1 s2. No murmur. No rubs or gallop.  Gastrointestinal: Soft, non-tender. +Bowel sounds.   Extremities: 2+ Peripheral Pulses, No clubbing, cyanosis, or edema  Psychiatric: Calm . no agitation.   Skin: Warm and dry, no ulcers on extremities     LABS:                            11.7   3.95  )-----------( 168      ( 03 Jun 2025 01:30 )             36.5     06-03    140  |  103  |  17.6  ----------------------------<  117[H]  4.4   |  27.0  |  0.83    Ca    8.6      03 Jun 2025 01:30  Phos  3.3     06-03  Mg     1.8     06-03        Urinalysis Basic - ( 03 Jun 2025 01:30 )    Color: x / Appearance: x / SG: x / pH: x  Gluc: 117 mg/dL / Ketone: x  / Bili: x / Urobili: x   Blood: x / Protein: x / Nitrite: x   Leuk Esterase: x / RBC: x / WBC x   Sq Epi: x / Non Sq Epi: x / Bacteria: x              INTERPRETATION OF TELEMETRY:     ECG:   Prior ECG: Yes [  ] No [  ]    RADIOLOGY & ADDITIONAL STUDIES:    X-ray:    CT scan:   MRI:   US:                                                [facility]                                              CARDIOLOGY AT [facility]                                                   [address], [place] [address]                                             Telephone: [phone]. Fax:[phone]                                                       CARDIOLOGY CONSULTATION NOTE                                                                                             History obtained by: Patient and medical record  Community Cardiologist:   Unknown    obtained: Yes [  ] No [  ]  Reason for Consultation:  Arrhythmia  Available out pt records reviewed: Yes [  ] No [  ]    Chief complaint:    Patient is a 79y old  Male who presents with a chief complaint of SDH.        HPI:  79yM PMH HLD, HTN, prostate CA, seasonal allergies, BPH, cardiac catheterization many years ago s/p 2 stents at [facility],  originally on Plavix but then stopped 2/2 ulcerative colitis and never went on ASA, h/o prostate CA, lung CA, melanoma s/p resection, remote history of afib after pulmonary testing several years ago originally on Eliquis but it flared up his ulcerative colitis so came off of it and hasn't heard about arrhythmia since then, squamous cell CA s/p radiation, h/o ACDF 1996, h/o anterior approach lumbar fusion in [place], [place] 1 year ago 5/28/24, now presents to ED s/p fall Saturday 5/24 without LOC, since fall has had difficulty walking and headaches unrelieved (at worst 5/10, currently 3/10) despite taking Tylenol/Advil.    Patient admitted for HEAD CT:  Moderate to large acute on subacute subdural hematoma right cerebral convexity with associated mass effect.  Patient underwent MMA embolization.  A consult was placed due to an arrythmia noted on the monitor with a concern for Afib.   Patient examined bedside in Neuro ICU and currently dizziness, headache chest pain, palpitations sob, pnd, orthopnea, hemoptysis, n/v/d/abd pain, cramps, urianry discomfort, fever, chills, or any other discomfort.      PAST MEDICAL & SURGICAL HISTORY:  Hyperlipidemia  Hypertension  Prostate CA  Seasonal allergies  BPH (benign prostatic hyperplasia)  Ulcerative colitis  H/O cardiac catheterization s/p 2 stents, originally was on Plavix then removed after developing ulcerative colitis  Cervical vertebral fusion surgery 1996  H/O hernia repair left and right inguinal in 2019  and umbilical repair  Status post correction of deviated nasal septum 1970s  Anterior approached lumbar fusion 5/28/24 in [place], [place] by Dr. Hunter  Lung cancer s/p nodule resection  Melanoma s/p resection  Squamous cell cancer s/p radiation    FAMILY HISTORY:  FH: prostate cancer (Sibling)    SOCIAL HISTORY:  Tobacco Use: Past smoker, smoked teenager until 40 y/o at 3PPD  EtOH use: Drinks a glass of wine daily with dinner  Substance: Denies    Allergies  Levaquin (Swelling)  Eliquis (exacerbates ulcerative colitis)    REVIEW OF SYSTEMS  As noted in HPI    HOME MEDICATIONS:  Home Medications:  Cialis 10 mg oral tablet: 1 tab(s) orally once a day PM  Singulair 10 mg oral tablet: 1 tab(s) orally once a day  Skyrizi infusions    MEDICATIONS:  Antibiotics:    Neuro:    Anticoagulation:    OTHER:    IVF:    Vital Signs Last 24 Hrs  T(C): 36.7 (30 May 2025 11:56), Max: 36.7 (30 May 2025 11:56)  T(F): 98 (30 May 2025 11:56), Max: 98 (30 May 2025 11:56)  HR: 71 (30 May 2025 11:56) (71 - 80)  BP: 131/67 (30 May 2025 11:56) (112/66 - 131/67)  BP(mean): --  RR: 20 (30 May 2025 11:56) (20 - 20)  SpO2: 98% (30 May 2025 11:56) (98% - 98%)    Parameters below as of 30 May 2025 11:56  Patient On (Oxygen Delivery Method): room air    PHYSICAL EXAM:  GENERAL: NAD, well-groomed  HEAD: Atraumatic, normocephalic  COURTNEY COMA SCORE: E- 4 V- 5 M- 6=15  MENTAL STATUS: AAO x3; Appropriately conversant without aphasia; following commands  CRANIAL NERVES: PERRL. EOMI without nystagmus. Facial sensation intact V1-3 distribution b/l. Face symmetric w/ normal eye closure and smile, tongue midline. Hearing grossly intact. Speech clear  MOTOR: strength 5/5 upper extremities  SENSATION: grossly intact to light touch all extremities  CHEST/LUNG: Nonlabored  HEART: regular rate  ABDOMEN: mild RLQ/flank pain    LABS:      RADIOLOGY & ADDITIONAL STUDIES:  CT Cervical Spine No Cont (05.30.25 @ 16:08)  IMPRESSION:  HEAD CT:  Moderate to large acute on subacute subdural hematoma right   cerebral convexity with associated mass effect..  CERVICAL SPINE CT:  No evidence of an acute cervical spine fracture.   (30 May 2025 18:23)      CARDIAC TESTING   ECHO:    STRESS:    CATH:     ELECTROPHYSIOLOGY:     PAST MEDICAL HISTORY  Hyperlipidemia    Hypertension    Prostate CA    Seasonal allergies    BPH (benign prostatic hyperplasia)    Ulcerative colitis        PAST SURGICAL HISTORY  H/O cardiac catheterization    Cervical vertebral fusion    H/O hernia repair    Status post correction of deviated nasal septum        SOCIAL HISTORY:  Denies smoking/alcohol/drugs  CIGARETTES:     ALCOHOL:  DRUGS:    FAMILY HISTORY:  FH: prostate cancer (Sibling)      Family History of Cardiovascular Disease:  Yes [  ] No [  ]  Coronary Artery Disease in first degree relative: Yes [  ] No [  ]  Sudden Cardiac Death in First degree relative: Yes [  ] No [  ]    HOME MEDICATIONS:  Asacol 400 mg oral delayed release tablet: 2  orally 3 times a day (11 May 2022 06:47)  Cialis 10 mg oral tablet: 1 tab(s) orally once a day PM (11 May 2022 06:47)  Crestor 10 mg oral tablet: 1 tab(s) orally once a day (11 May 2022 06:47)  Norvasc 5 mg oral tablet: 1 tab(s) orally once a day (11 May 2022 06:47)  Singulair 10 mg oral tablet: 1 tab(s) orally once a day (11 May 2022 06:47)  Tylenol 325 mg oral tablet: 2 tab(s) orally every 4 hours, As Needed (11 May 2022 06:47)  Visine 0.05% ophthalmic solution: 2 drop(s) to each affected eye once a day (11 May 2022 06:47)      CURRENT CARDIAC MEDICATIONS:  hydrALAZINE Injectable 10 milliGRAM(s) IV Push every 4 hours PRN SBP > 160  labetalol Injectable 10 milliGRAM(s) IV Push every 4 hours PRN SBP > 160      CURRENT OTHER MEDICATIONS:  montelukast 10 milliGRAM(s) Oral daily  acetaminophen     Tablet .. 650 milliGRAM(s) Oral every 6 hours PRN Temp greater or equal to 38C (100.4F), Mild Pain (1 - 3)  levETIRAcetam 500 milliGRAM(s) Oral two times a day  traMADol 25 milliGRAM(s) Oral every 6 hours PRN Moderate Pain (4 - 6)  polyethylene glycol 3350 17 Gram(s) Oral daily  enoxaparin Injectable 40 milliGRAM(s) SubCutaneous every 24 hours  multivitamin 1 Tablet(s) Oral daily  rosuvastatin 10 milliGRAM(s) Oral at bedtime  tamsulosin 0.4 milliGRAM(s) Oral at bedtime      ALLERGIES:   Levaquin (Swelling)      REVIEW OF SYMPTOMS:   CONSTITUTIONAL: No fever, no chills, no weight loss, no weight gain, no fatigue   ENMT:  No vertigo; No sinus or throat pain  NECK: No pain or stiffness  CARDIOVASCULAR: No chest pain, no dyspnea, no syncope/presyncope, no palpitations, no dizziness, no Orthopnea, no Paroxsymal nocturnal dyspnea  RESPIRATORY: no Shortness of breath, no cough, no wheezing  : No dysuria, no hematuria   GI: No dark color stool, no nausea, no diarrhea, no constipation, no abdominal pain   NEURO: No headache, no slurred speech   MUSCULOSKELETAL: No joint pain or swelling; No muscle, back, or extremity pain  PSYCH: No agitation, no anxiety.    ALL OTHER REVIEW OF SYSTEMS ARE NEGATIVE.    VITAL SIGNS:  T(C): 36.7 (06-03-25 @ 08:07), Max: 36.7 (06-02-25 @ 16:42)  T(F): 98 (06-03-25 @ 08:07), Max: 98 (06-02-25 @ 16:42)  HR: 64 (06-03-25 @ 09:03) (44 - 80)  BP: 126/70 (06-03-25 @ 08:00) (126/70 - 136/63)  RR: 14 (06-03-25 @ 09:03) (14 - 21)  SpO2: 96% (06-03-25 @ 09:03) (95% - 100%)    INTAKE AND OUTPUT:     06-02 @ 07:01  -  06-03 @ 07:00  --------------------------------------------------------  IN: 960 mL / OUT: 0 mL / NET: 960 mL        PHYSICAL EXAM:  Constitutional: Comfortable . No acute distress.   HEENT: Atraumatic and normocephalic , neck is supple . no JVD. No carotid bruit.  CNS: A&Ox3. No focal deficits.   Respiratory: CTAB, unlabored   Cardiovascular: RRR normal s1 s2. No murmur. No rubs or gallop.  Gastrointestinal: Soft, non-tender. +Bowel sounds.   Extremities: 2+ Peripheral Pulses, No clubbing, cyanosis, or edema  Psychiatric: Calm . no agitation.   Skin: Warm and dry, no ulcers on extremities     LABS:                            11.7   3.95  )-----------( 168      ( 03 Jun 2025 01:30 )             36.5     06-03    140  |  103  |  17.6  ----------------------------<  117[H]  4.4   |  27.0  |  0.83    Ca    8.6      03 Jun 2025 01:30  Phos  3.3     06-03  Mg     1.8     06-03        Urinalysis Basic - ( 03 Jun 2025 01:30 )    Color: x / Appearance: x / SG: x / pH: x  Gluc: 117 mg/dL / Ketone: x  / Bili: x / Urobili: x   Blood: x / Protein: x / Nitrite: x   Leuk Esterase: x / RBC: x / WBC x   Sq Epi: x / Non Sq Epi: x / Bacteria: x              INTERPRETATION OF TELEMETRY:     ECG:   Prior ECG: Yes [  ] No [  ]    RADIOLOGY & ADDITIONAL STUDIES:    X-ray:    CT scan:   MRI:   US:

## 2025-06-03 NOTE — CHART NOTE - NSCHARTNOTESELECT_GEN_ALL_CORE
Arrhythmia/Event Note
Neurointerventional Surgery Pre Procedure Note/Event Note
Neurointerventional Surgery Post Procedure Note/Event Note
Transfer Note

## 2025-06-03 NOTE — PROGRESS NOTE ADULT - ASSESSMENT
Assessment: 79yM  s/p fall Saturday 5/24 without LOC p/w with worsening gait instability and headaches (recent advil use). POD 3 MMA Embolization and percutaneous subdural drain placement    Plan:      -Discussed with Dr. Reid  -Neuro Checks Q4  -CTH 6/1 stable s/p drain removal, staples open to air   -Stat CTH for any change in neuro exam   -Keppra 500 BID x 7 days   --160, PRN hydralazine/ Labetalol   -Continue home rosuvastatin 10 mg   -RA  -Regular diet  -Last BM 6/1, continue miralax   -Voiding  -HX BPH: continue Flomax 0.4 qHS  -Pain control: Tylenol, Tramadol 25/50 PRN   -DVT ppx: SCD's, SQL   -PT/OT: recommending AR, pending auth

## 2025-06-03 NOTE — DISCHARGE NOTE PROVIDER - NSDCFUADDINST_GEN_ALL_CORE_FT
Please make an appointment for follow up with neurosurgeon Dr. Jean Reid's office in 1-2 weeks for wound check. Call [phone] to schedule an appointment.     After you are discharged from the hospital:     Avoid strenuous activities, heavy lifting, or vigorous exercises for the first 24 to 48 hours after the procedure.  Refrain from driving or operating heavy machinery for at least 24 hours or as instructed by your doctor.  Gradually resume normal activities after the initial recovery period, following your doctor's recommendations.  Keep the incision site clean and dry. If there is a dressing, you may remove it the day after your procedure unless otherwise instructed.  You can shower the day after your procedure, but don't swim or sit in a bath or hot tub until your incision site has healed.  Report any signs of infection, such as redness, swelling, increased pain, or drainage, to your doctor immediately.  Take all prescribed medications as instructed by your healthcare provider.  Inform your doctor about any allergies or adverse reactions to medications.  Maintain a balanced diet and stay well-hydrated to help your body flush out the dye unless advised otherwise by your healthcare provider.      Be aware of any unusual symptoms, such as severe headache, weakness, numbness, speech difficulties, vision changes, or signs of bleeding. If you experience any concerning symptoms, seek medical attention immediately. Allow yourself time to rest and recover at home for the first 24 to 48 hours after the procedure.

## 2025-06-03 NOTE — DISCHARGE NOTE PROVIDER - HOSPITAL COURSE
79yM PMH HLD, HTN, prostate CA, seasonal allergies, BPH, cardiac catheterization many years ago s/p 2 stents originally on Plavix but then stopped 2/2 ulcerative colitis and never went on ASA, h/o prostate CA, lung CA, melanoma s/p resection, remote history of afib after pulmonary testing several years ago originally on Eliquis but it flared up his ulcerative colitis so came off of it and hasn't heard about arrhythmia since then, squamous cell CA s/p radiation, h/o ACDF 1996, h/o anterior approach lumbar fusion in [place], [place] 1 year ago 5/28/24, now presents to ED s/p fall Saturday 5/24 without LOC, since fall has had difficulty walking and headaches unrelieved (at worst 5/10, currently 3/10) despite taking Tylenol/Advil. CTH showing 2.4 cm acute on chronic SDH with mild mass effect. Patient with no evidence of focal neurological deficit. Patient s/p R MMA embolization and R percutaneous subdural drain placement with evacuation of SDH with subdural drain placement on 5/31. Keppra 500 BID started on 6/1 for seizure ppx and to remain for a total of 7 days. Patient with recurrent headaches, on tramadol and Tylenol for PRN pain relief. 6/1 Subdural drain removed, post pull CTH stable. Neurologically stable and hemodynamically stable for downgrade to telemetry from NSICU. Post of course complicated by Afib, rate controlled, cardiology consulted recommending _________. Patient seen by PT/OT recommending acute inpatient rehab. Patient seen and examined and is neurologically and hemodynamically cleared for discharge.       On _____, the patient and/or their family member/caretaker were provided with a brief summary and overview of the hospital course including admission date, diagnosis, surgery / procedure, complications if applicable, discharge instructions including but not limited to: incision care, signs/symptoms to look out for after discharge, home medications, new medications, patient / family education/training and instructions for follow up. Any concerns were addressed, and all questions were answered.     79yM PMH HLD, HTN, prostate CA, seasonal allergies, BPH, cardiac catheterization many years ago s/p 2 stents originally on Plavix but then stopped 2/2 ulcerative colitis and never went on ASA, h/o prostate CA, lung CA, melanoma s/p resection, remote history of afib after pulmonary testing several years ago originally on Eliquis but it flared up his ulcerative colitis so came off of it and hasn't heard about arrhythmia since then, squamous cell CA s/p radiation, h/o ACDF 1996, h/o anterior approach lumbar fusion in [place], [place] 1 year ago 5/28/24, now presents to ED s/p fall Saturday 5/24 without LOC, since fall has had difficulty walking and headaches unrelieved (at worst 5/10, currently 3/10) despite taking Tylenol/Advil. CTH showing 2.4 cm acute on chronic SDH with mild mass effect. Patient with no evidence of focal neurological deficit. Patient s/p R MMA embolization and R percutaneous subdural drain placement with evacuation of SDH with subdural drain placement on 5/31. Keppra 500 BID started on 6/1 for seizure ppx and to remain for a total of 7 days. Patient with recurrent headaches, on tramadol and Tylenol for PRN pain relief. 6/1 Subdural drain removed, post pull CTH stable. Neurologically stable and hemodynamically stable for downgrade to telemetry from NSICU. Post of course complicated by Afib, rate controlled, cardiology consulted recommending TTE and outpatient [facility]. Patient seen by PT/OT recommending acute inpatient rehab. Patient seen and examined and is neurologically and hemodynamically cleared for discharge.       On _____, the patient and/or their family member/caretaker were provided with a brief summary and overview of the hospital course including admission date, diagnosis, surgery / procedure, complications if applicable, discharge instructions including but not limited to: incision care, signs/symptoms to look out for after discharge, home medications, new medications, patient / family education/training and instructions for follow up. Any concerns were addressed, and all questions were answered.     79yM PMH HLD, HTN, prostate CA, seasonal allergies, BPH, cardiac catheterization many years ago s/p 2 stents originally on Plavix but then stopped 2/2 ulcerative colitis and never went on ASA, h/o prostate CA, lung CA, melanoma s/p resection, remote history of afib after pulmonary testing several years ago originally on Eliquis but it flared up his ulcerative colitis so came off of it and hasn't heard about arrhythmia since then, squamous cell CA s/p radiation, h/o ACDF 1996, h/o anterior approach lumbar fusion in [place], [place] 1 year ago 5/28/24, now presents to ED s/p fall Saturday 5/24 without LOC, since fall has had difficulty walking and headaches unrelieved (at worst 5/10, currently 3/10) despite taking Tylenol/Advil. CTH showing 2.4 cm acute on chronic SDH with mild mass effect. Patient with no evidence of focal neurological deficit. Patient s/p R MMA embolization and R percutaneous subdural drain placement with evacuation of SDH with subdural drain placement on 5/31. Keppra 500 BID started on 6/1 for seizure ppx and to remain for a total of 7 days. Patient with recurrent headaches, on tramadol and Tylenol for PRN pain relief. 6/1 Subdural drain removed, post pull CTH stable. Neurologically stable and hemodynamically stable for downgrade to telemetry from NSICU. Post of course complicated by Afib, rate controlled, cardiology consulted recommending TTE and outpatient [facility]. Patient seen by PT/OT recommending acute inpatient rehab. Patient seen and examined and is neurologically and hemodynamically cleared for discharge.

## 2025-06-03 NOTE — DISCHARGE NOTE PROVIDER - NSDCMRMEDTOKEN_GEN_ALL_CORE_FT
Asacol 400 mg oral delayed release tablet: 2  orally 3 times a day  cefpodoxime 200 mg oral tablet: 1 tab(s) orally 2 times a day MDD:400 mg  Cialis 10 mg oral tablet: 1 tab(s) orally once a day PM  Crestor 10 mg oral tablet: 1 tab(s) orally once a day  Norvasc 5 mg oral tablet: 1 tab(s) orally once a day  Singulair 10 mg oral tablet: 1 tab(s) orally once a day  Tylenol 325 mg oral tablet: 2 tab(s) orally every 4 hours, As Needed  Visine 0.05% ophthalmic solution: 2 drop(s) to each affected eye once a day   acetaminophen 325 mg oral tablet: 2 tab(s) orally every 6 hours As needed Temp greater or equal to 38C (100.4F), Mild Pain (1 - 3)  enoxaparin 40 mg/0.4 mL injectable solution: 40 milligram(s) subcutaneously  levETIRAcetam 500 mg oral tablet: 1 tab(s) orally 2 times a day  Multiple Vitamins oral tablet: 1 tab(s) orally once a day  polyethylene glycol 3350 oral powder for reconstitution: 17 gram(s) orally once a day  rosuvastatin 10 mg oral tablet: 1 tab(s) orally once a day (at bedtime)  Singulair 10 mg oral tablet: 1 tab(s) orally once a day  tamsulosin 0.4 mg oral capsule: 1 cap(s) orally once a day (at bedtime)  traMADol 50 mg oral tablet: 0.5 tab(s) orally every 6 hours As needed Moderate Pain (4 - 6)

## 2025-06-04 VITALS
HEART RATE: 74 BPM | SYSTOLIC BLOOD PRESSURE: 134 MMHG | RESPIRATION RATE: 16 BRPM | OXYGEN SATURATION: 96 % | DIASTOLIC BLOOD PRESSURE: 97 MMHG

## 2025-06-04 DIAGNOSIS — S06.5XAA TRAUMATIC SUBDURAL HEMORRHAGE WITH LOSS OF CONSCIOUSNESS STATUS UNKNOWN, INITIAL ENCOUNTER: ICD-10-CM

## 2025-06-04 LAB
ANION GAP SERPL CALC-SCNC: 14 MMOL/L — SIGNIFICANT CHANGE UP (ref 5–17)
BUN SERPL-MCNC: 17.1 MG/DL — SIGNIFICANT CHANGE UP (ref 8–20)
CALCIUM SERPL-MCNC: 9 MG/DL — SIGNIFICANT CHANGE UP (ref 8.4–10.5)
CHLORIDE SERPL-SCNC: 97 MMOL/L — SIGNIFICANT CHANGE UP (ref 96–108)
CO2 SERPL-SCNC: 27 MMOL/L — SIGNIFICANT CHANGE UP (ref 22–29)
CREAT SERPL-MCNC: 0.99 MG/DL — SIGNIFICANT CHANGE UP (ref 0.5–1.3)
EGFR: 78 ML/MIN/1.73M2 — SIGNIFICANT CHANGE UP
EGFR: 78 ML/MIN/1.73M2 — SIGNIFICANT CHANGE UP
GLUCOSE SERPL-MCNC: 168 MG/DL — HIGH (ref 70–99)
HCT VFR BLD CALC: 37.9 % — LOW (ref 39–50)
HGB BLD-MCNC: 12.1 G/DL — LOW (ref 13–17)
MAGNESIUM SERPL-MCNC: 1.9 MG/DL — SIGNIFICANT CHANGE UP (ref 1.6–2.6)
MCHC RBC-ENTMCNC: 29.7 PG — SIGNIFICANT CHANGE UP (ref 27–34)
MCHC RBC-ENTMCNC: 31.9 G/DL — LOW (ref 32–36)
MCV RBC AUTO: 92.9 FL — SIGNIFICANT CHANGE UP (ref 80–100)
NRBC # BLD AUTO: 0 K/UL — SIGNIFICANT CHANGE UP (ref 0–0)
NRBC # FLD: 0 K/UL — SIGNIFICANT CHANGE UP (ref 0–0)
NRBC BLD AUTO-RTO: 0 /100 WBCS — SIGNIFICANT CHANGE UP (ref 0–0)
PHOSPHATE SERPL-MCNC: 2.9 MG/DL — SIGNIFICANT CHANGE UP (ref 2.4–4.7)
PLATELET # BLD AUTO: 186 K/UL — SIGNIFICANT CHANGE UP (ref 150–400)
PMV BLD: 9.4 FL — SIGNIFICANT CHANGE UP (ref 7–13)
POTASSIUM SERPL-MCNC: 4.7 MMOL/L — SIGNIFICANT CHANGE UP (ref 3.5–5.3)
POTASSIUM SERPL-SCNC: 4.7 MMOL/L — SIGNIFICANT CHANGE UP (ref 3.5–5.3)
RBC # BLD: 4.08 M/UL — LOW (ref 4.2–5.8)
RBC # FLD: 12.8 % — SIGNIFICANT CHANGE UP (ref 10.3–14.5)
SODIUM SERPL-SCNC: 138 MMOL/L — SIGNIFICANT CHANGE UP (ref 135–145)
WBC # BLD: 3.67 K/UL — LOW (ref 3.8–10.5)
WBC # FLD AUTO: 3.67 K/UL — LOW (ref 3.8–10.5)

## 2025-06-04 PROCEDURE — C1889: CPT

## 2025-06-04 PROCEDURE — 84100 ASSAY OF PHOSPHORUS: CPT

## 2025-06-04 PROCEDURE — 87641 MR-STAPH DNA AMP PROBE: CPT

## 2025-06-04 PROCEDURE — 72125 CT NECK SPINE W/O DYE: CPT

## 2025-06-04 PROCEDURE — 85730 THROMBOPLASTIN TIME PARTIAL: CPT

## 2025-06-04 PROCEDURE — 36227 PLACE CATH XTRNL CAROTID: CPT

## 2025-06-04 PROCEDURE — 97535 SELF CARE MNGMENT TRAINING: CPT

## 2025-06-04 PROCEDURE — 36224 PLACE CATH CAROTD ART: CPT

## 2025-06-04 PROCEDURE — 72192 CT PELVIS W/O DYE: CPT

## 2025-06-04 PROCEDURE — C1760: CPT

## 2025-06-04 PROCEDURE — C1894: CPT

## 2025-06-04 PROCEDURE — 85027 COMPLETE CBC AUTOMATED: CPT

## 2025-06-04 PROCEDURE — 99233 SBSQ HOSP IP/OBS HIGH 50: CPT

## 2025-06-04 PROCEDURE — 86900 BLOOD TYPING SEROLOGIC ABO: CPT

## 2025-06-04 PROCEDURE — 80048 BASIC METABOLIC PNL TOTAL CA: CPT

## 2025-06-04 PROCEDURE — 83735 ASSAY OF MAGNESIUM: CPT

## 2025-06-04 PROCEDURE — 85610 PROTHROMBIN TIME: CPT

## 2025-06-04 PROCEDURE — 75898 FOLLOW-UP ANGIOGRAPHY: CPT

## 2025-06-04 PROCEDURE — 70450 CT HEAD/BRAIN W/O DYE: CPT

## 2025-06-04 PROCEDURE — 36415 COLL VENOUS BLD VENIPUNCTURE: CPT

## 2025-06-04 PROCEDURE — 80053 COMPREHEN METABOLIC PANEL: CPT

## 2025-06-04 PROCEDURE — C1887: CPT

## 2025-06-04 PROCEDURE — 75894 X-RAYS TRANSCATH THERAPY: CPT

## 2025-06-04 PROCEDURE — 96374 THER/PROPH/DIAG INJ IV PUSH: CPT

## 2025-06-04 PROCEDURE — C9399: CPT

## 2025-06-04 PROCEDURE — 71045 X-RAY EXAM CHEST 1 VIEW: CPT

## 2025-06-04 PROCEDURE — 93970 EXTREMITY STUDY: CPT

## 2025-06-04 PROCEDURE — 99285 EMERGENCY DEPT VISIT HI MDM: CPT

## 2025-06-04 PROCEDURE — 61624 TCAT PERM OCCLS/EMBOLJ CNS: CPT

## 2025-06-04 PROCEDURE — 97530 THERAPEUTIC ACTIVITIES: CPT

## 2025-06-04 PROCEDURE — 93005 ELECTROCARDIOGRAM TRACING: CPT

## 2025-06-04 PROCEDURE — 97116 GAIT TRAINING THERAPY: CPT

## 2025-06-04 PROCEDURE — 93306 TTE W/DOPPLER COMPLETE: CPT

## 2025-06-04 PROCEDURE — 86850 RBC ANTIBODY SCREEN: CPT

## 2025-06-04 PROCEDURE — 87640 STAPH A DNA AMP PROBE: CPT

## 2025-06-04 PROCEDURE — 85025 COMPLETE CBC W/AUTO DIFF WBC: CPT

## 2025-06-04 PROCEDURE — C1769: CPT

## 2025-06-04 PROCEDURE — 96375 TX/PRO/DX INJ NEW DRUG ADDON: CPT

## 2025-06-04 PROCEDURE — 86901 BLOOD TYPING SEROLOGIC RH(D): CPT

## 2025-06-04 RX ORDER — ACETAMINOPHEN 500 MG/5ML
2 LIQUID (ML) ORAL
Qty: 0 | Refills: 0 | DISCHARGE
Start: 2025-06-04

## 2025-06-04 RX ORDER — B1/B2/B3/B5/B6/B12/VIT C/FOLIC 500-0.5 MG
1 TABLET ORAL
Qty: 0 | Refills: 0 | DISCHARGE
Start: 2025-06-04

## 2025-06-04 RX ORDER — TAMSULOSIN HYDROCHLORIDE 0.4 MG/1
1 CAPSULE ORAL
Qty: 0 | Refills: 0 | DISCHARGE
Start: 2025-06-04

## 2025-06-04 RX ORDER — ENOXAPARIN SODIUM 100 MG/ML
40 INJECTION SUBCUTANEOUS
Qty: 1 | Refills: 0
Start: 2025-06-04

## 2025-06-04 RX ORDER — TRAMADOL HYDROCHLORIDE 50 MG/1
0.5 TABLET, FILM COATED ORAL
Qty: 0 | Refills: 0 | DISCHARGE
Start: 2025-06-04

## 2025-06-04 RX ORDER — POLYETHYLENE GLYCOL 3350 17 G/17G
17 POWDER, FOR SOLUTION ORAL
Qty: 0 | Refills: 0 | DISCHARGE
Start: 2025-06-04

## 2025-06-04 RX ORDER — ROSUVASTATIN CALCIUM 20 MG/1
1 TABLET, FILM COATED ORAL
Qty: 0 | Refills: 0 | DISCHARGE
Start: 2025-06-04

## 2025-06-04 RX ORDER — LEVETIRACETAM 10 MG/ML
1 INJECTION, SOLUTION INTRAVENOUS
Qty: 0 | Refills: 0 | DISCHARGE
Start: 2025-06-04

## 2025-06-04 RX ADMIN — TRAMADOL HYDROCHLORIDE 25 MILLIGRAM(S): 50 TABLET, FILM COATED ORAL at 05:06

## 2025-06-04 RX ADMIN — Medication 1 TABLET(S): at 11:13

## 2025-06-04 RX ADMIN — TRAMADOL HYDROCHLORIDE 25 MILLIGRAM(S): 50 TABLET, FILM COATED ORAL at 06:00

## 2025-06-04 RX ADMIN — MONTELUKAST SODIUM 10 MILLIGRAM(S): 10 TABLET ORAL at 11:13

## 2025-06-04 RX ADMIN — LEVETIRACETAM 500 MILLIGRAM(S): 10 INJECTION, SOLUTION INTRAVENOUS at 05:05

## 2025-06-04 NOTE — PROGRESS NOTE ADULT - SUBJECTIVE AND OBJECTIVE BOX
HPI:  ***incomplete note***   HISTORY OF PRESENT ILLNESS:   79yM PMH HLD, HTN, prostate CA, seasonal allergies, BPH, cardiac catheterization many years ago s/p 2 stents originally on Plavix but then stopped 2/2 ulcerative colitis and never went on ASA, h/o prostate CA, lung CA, melanoma s/p resection, remote history of afib after pulmonary testing several years ago originally on Eliquis but it flared up his ulcerative colitis so came off of it and hasn't heard about arrhythmia since then, squamous cell CA s/p radiation, h/o ACDF 1996, h/o anterior approach lumbar fusion in [place], [place] 1 year ago 5/28/24, now presents to ED s/p fall Saturday 5/24 without LOC, since fall has had difficulty walking and headaches unrelieved (at worst 5/10, currently 3/10) despite taking Tylenol/Advil. Attests to mild RLQ abdominal pain since he has for several months now. Denies dizziness, nausea, vomiting, chest pain, palpitations, SOB, numbness, tingling, new weakness.    Vital Signs Last 24 Hrs  T(C): 36.7 (30 May 2025 11:56), Max: 36.7 (30 May 2025 11:56)  T(F): 98 (30 May 2025 11:56), Max: 98 (30 May 2025 11:56)  HR: 71 (30 May 2025 11:56) (71 - 80)  BP: 131/67 (30 May 2025 11:56) (112/66 - 131/67)  BP(mean): --  RR: 20 (30 May 2025 11:56) (20 - 20)  SpO2: 98% (30 May 2025 11:56) (98% - 98%)    Parameters below as of 30 May 2025 11:56  Patient On (Oxygen Delivery Method): room air    INTERVAL HPI/OVERNIGHT EVENTS:  79y Male s/p __ seen lying comfortably in bed. Tolerating diet. Passing gas/BM. Voiding. Scott in with __ clear urine. Denies headache, weakness, numbness, n/v/d, fevers, chills, chest pain, SOB.     Vital Signs Last 24 Hrs  T(C): 36.4 (04 Jun 2025 04:36), Max: 36.7 (03 Jun 2025 08:07)  T(F): 97.5 (04 Jun 2025 04:36), Max: 98 (03 Jun 2025 08:07)  HR: 52 (04 Jun 2025 04:00) (52 - 80)  BP: 133/53 (04 Jun 2025 04:00) (125/82 - 150/71)  BP(mean): 78 (04 Jun 2025 04:00) (78 - 96)  RR: 11 (04 Jun 2025 04:00) (11 - 20)  SpO2: 95% (04 Jun 2025 04:00) (95% - 100%)    Parameters below as of 04 Jun 2025 04:00  Patient On (Oxygen Delivery Method): nasal cannula  O2 Flow (L/min): 2      LABS:                        11.7   3.95  )-----------( 168      ( 03 Jun 2025 01:30 )             36.5     06-03    140  |  103  |  17.6  ----------------------------<  117[H]  4.4   |  27.0  |  0.83    Ca    8.6      03 Jun 2025 01:30  Phos  3.3     06-03  Mg     1.8     06-03        Urinalysis Basic - ( 03 Jun 2025 01:30 )    Color: x / Appearance: x / SG: x / pH: x  Gluc: 117 mg/dL / Ketone: x  / Bili: x / Urobili: x   Blood: x / Protein: x / Nitrite: x   Leuk Esterase: x / RBC: x / WBC x   Sq Epi: x / Non Sq Epi: x / Bacteria: x      RADIOLOGY & ADDITIONAL TESTS:   HPI:  ***incomplete note***         HISTORY OF PRESENT ILLNESS:   79yM PMH HLD, HTN, prostate CA, seasonal allergies, BPH, cardiac catheterization many years ago s/p 2 stents originally on Plavix but then stopped 2/2 ulcerative colitis and never went on ASA, h/o prostate CA, lung CA, melanoma s/p resection, remote history of afib after pulmonary testing several years ago originally on Eliquis but it flared up his ulcerative colitis so came off of it and hasn't heard about arrhythmia since then, squamous cell CA s/p radiation, h/o ACDF 1996, h/o anterior approach lumbar fusion in [place], [place] 1 year ago 5/28/24, now presents to ED s/p fall Saturday 5/24 without LOC, since fall has had difficulty walking and headaches unrelieved (at worst 5/10, currently 3/10) despite taking Tylenol/Advil. Attests to mild RLQ abdominal pain since he has for several months now. Denies dizziness, nausea, vomiting, chest pain, palpitations, SOB, numbness, tingling, new weakness.    Vital Signs Last 24 Hrs  T(C): 36.7 (30 May 2025 11:56), Max: 36.7 (30 May 2025 11:56)  T(F): 98 (30 May 2025 11:56), Max: 98 (30 May 2025 11:56)  HR: 71 (30 May 2025 11:56) (71 - 80)  BP: 131/67 (30 May 2025 11:56) (112/66 - 131/67)  BP(mean): --  RR: 20 (30 May 2025 11:56) (20 - 20)  SpO2: 98% (30 May 2025 11:56) (98% - 98%)    Parameters below as of 30 May 2025 11:56  Patient On (Oxygen Delivery Method): room air    INTERVAL HPI/OVERNIGHT EVENTS:  79y Male s/p __ seen lying comfortably in bed. Tolerating diet. Passing gas/BM. Voiding. Scott in with __ clear urine. Denies headache, weakness, numbness, n/v/d, fevers, chills, chest pain, SOB.     Vital Signs Last 24 Hrs  T(C): 36.4 (04 Jun 2025 04:36), Max: 36.7 (03 Jun 2025 08:07)  T(F): 97.5 (04 Jun 2025 04:36), Max: 98 (03 Jun 2025 08:07)  HR: 52 (04 Jun 2025 04:00) (52 - 80)  BP: 133/53 (04 Jun 2025 04:00) (125/82 - 150/71)  BP(mean): 78 (04 Jun 2025 04:00) (78 - 96)  RR: 11 (04 Jun 2025 04:00) (11 - 20)  SpO2: 95% (04 Jun 2025 04:00) (95% - 100%)    Parameters below as of 04 Jun 2025 04:00  Patient On (Oxygen Delivery Method): nasal cannula  O2 Flow (L/min): 2      LABS:                        11.7   3.95  )-----------( 168      ( 03 Jun 2025 01:30 )             36.5     06-03    140  |  103  |  17.6  ----------------------------<  117[H]  4.4   |  27.0  |  0.83    Ca    8.6      03 Jun 2025 01:30  Phos  3.3     06-03  Mg     1.8     06-03        Urinalysis Basic - ( 03 Jun 2025 01:30 )    Color: x / Appearance: x / SG: x / pH: x  Gluc: 117 mg/dL / Ketone: x  / Bili: x / Urobili: x   Blood: x / Protein: x / Nitrite: x   Leuk Esterase: x / RBC: x / WBC x   Sq Epi: x / Non Sq Epi: x / Bacteria: x      RADIOLOGY & ADDITIONAL TESTS:   HPI:    HISTORY OF PRESENT ILLNESS:   79yM PMH HLD, HTN, prostate CA, seasonal allergies, BPH, cardiac catheterization many years ago s/p 2 stents originally on Plavix but then stopped 2/2 ulcerative colitis and never went on ASA, h/o prostate CA, lung CA, melanoma s/p resection, remote history of afib after pulmonary testing several years ago originally on Eliquis but it flared up his ulcerative colitis so came off of it and hasn't heard about arrhythmia since then, squamous cell CA s/p radiation, h/o ACDF 1996, h/o anterior approach lumbar fusion in [place], [place] 1 year ago 5/28/24, now presents to ED s/p fall Saturday 5/24 without LOC, since fall has had difficulty walking and headaches unrelieved (at worst 5/10, currently 3/10) despite taking Tylenol/Advil. Attests to mild RLQ abdominal pain since he has for several months now. Denies dizziness, nausea, vomiting, chest pain, palpitations, SOB, numbness, tingling, new weakness.    Vital Signs Last 24 Hrs  T(C): 36.7 (30 May 2025 11:56), Max: 36.7 (30 May 2025 11:56)  T(F): 98 (30 May 2025 11:56), Max: 98 (30 May 2025 11:56)  HR: 71 (30 May 2025 11:56) (71 - 80)  BP: 131/67 (30 May 2025 11:56) (112/66 - 131/67)  BP(mean): --  RR: 20 (30 May 2025 11:56) (20 - 20)  SpO2: 98% (30 May 2025 11:56) (98% - 98%)    Parameters below as of 30 May 2025 11:56  Patient On (Oxygen Delivery Method): room air    INTERVAL HPI/OVERNIGHT EVENTS:  79y Male seen and examined at bedside. Neurologically stable.[facility] Accepted patient to the facility however pending prior authorization.   T(C): 36.4 (04 Jun 2025 04:36), Max: 36.7 (03 Jun 2025 08:07)  T(F): 97.5 (04 Jun 2025 04:36), Max: 98 (03 Jun 2025 08:07)  HR: 52 (04 Jun 2025 04:00) (52 - 80)  BP: 133/53 (04 Jun 2025 04:00) (125/82 - 150/71)  BP(mean): 78 (04 Jun 2025 04:00) (78 - 96)  RR: 11 (04 Jun 2025 04:00) (11 - 20)  SpO2: 95% (04 Jun 2025 04:00) (95% - 100%)    Parameters below as of 04 Jun 2025 04:00  Patient On (Oxygen Delivery Method): nasal cannula  O2 Flow (L/min): 2      LABS:                        11.7   3.95  )-----------( 168      ( 03 Jun 2025 01:30 )             36.5     06-03    140  |  103  |  17.6  ----------------------------<  117[H]  4.4   |  27.0  |  0.83    Ca    8.6      03 Jun 2025 01:30  Phos  3.3     06-03  Mg     1.8     06-03        Urinalysis Basic - ( 03 Jun 2025 01:30 )    Color: x / Appearance: x / SG: x / pH: x  Gluc: 117 mg/dL / Ketone: x  / Bili: x / Urobili: x   Blood: x / Protein: x / Nitrite: x   Leuk Esterase: x / RBC: x / WBC x   Sq Epi: x / Non Sq Epi: x / Bacteria: x      RADIOLOGY & ADDITIONAL TESTS:    no recent Neuroimaging.

## 2025-06-04 NOTE — PROGRESS NOTE ADULT - NS ATTEND AMEND GEN_ALL_CORE FT
79yM PMH HLD, HTN, prostate CA, seasonal allergies, BPH, cardiac catheterization many years ago s/p 2 stents at [facility],  originally on Plavix but then stopped 2/2 ulcerative colitis and never went on ASA, h/o prostate CA, lung CA, melanoma s/p resection, remote history of afib after pulmonary testing several years ago originally on Eliquis but it flared up his ulcerative colitis so came off of it and hasn't heard about arrhythmia since then, squamous cell CA s/p radiation, h/o ACDF 1996, h/o anterior approach lumbar fusion in [place], [place] 1 year ago 5/28/24, now presents to ED s/p fall Saturday 5/24 without LOC, since fall has had difficulty walking and headaches unrelieved (at worst 5/10, currently 3/10) despite taking Tylenol/Advil.    Patient admitted for HEAD CT:  Moderate to large acute on subacute subdural hematoma right cerebral convexity with associated mass effect.  Patient underwent MMA embolization.  A consult was placed due to an arrythmia noted on the monitor with a concern for Afib.       Sinus rhythm  Sinus pause 2.5 seconds longest  Premature atrial contractions  CAD with hx of PCI  Hyperlipidemia  Hypertension  remote history of atrial fibrillation      Telemetry review: Sinus rhythm with PACS, there are pauses of up to 2.5 seconds, patient is awake during episodes and asymptomatic, watching TV.    He reports a remote history of atrial fibrillation and hx of PCI     no inpatient workup as patient has declined all further cardiac testing and evaluation. not interested in invasive procedures or interventions    f/u with EP as outpatient  we will sign off

## 2025-06-04 NOTE — PROGRESS NOTE ADULT - ASSESSMENT
None 79yM  s/p fall Saturday 5/24 without LOC p/w with worsening gait instability and headaches (recent advil use). POD 3 MMA Embolization and percutaneous subdural drain placement    Plan:      -Discussed with Dr. Reid  -Neuro Checks Q4  -CTH 6/1 stable s/p drain removal, staples open to air   -Stat CTH for any change in neuro exam   -Keppra 500 BID x 7 days till 6/8  --160, PRN hydralazine  - Hold AV nodals 2/2 pauses on rhythm strip   -RA  -Regular diet  -Last BM 6/1, continue miralax   -Voiding  -HX BPH: continue Flomax 0.4 qHS  -Pain control: Tylenol, Tramadol 25/50 PRN   -DVT ppx: SCD's, SQL   -PT/OT: recommending AR, pending auth 79yM  s/p fall Saturday 5/24 without LOC p/w with worsening gait instability and headaches (recent advil use). POD 3 MMA Embolization and percutaneous subdural drain placement    Plan:      -Discussed with Dr. Reid  -Neuro Checks Q4  -CTH 6/1 stable s/p drain removal, staples open to air   -Stat CTH for any change in neuro exam   -Keppra 500 BID x 7 days till 6/8  --160, PRN hydralazine  - Hold AV nodals 2/2 pauses on rhythm strip   - Outpatient MCOT placement   -RA  -Regular diet  -Last BM 6/1, continue miralax   -Voiding  -HX BPH: continue Flomax 0.4 qHS  -Pain control: Tylenol, Tramadol 25/50 PRN   -DVT ppx: SCD's, SQL   -PT/OT: recommending AR, pending auth   79yM  s/p fall Saturday 5/24 without LOC p/w with worsening gait instability and headaches (recent advil use). POD 3 MMA Embolization and percutaneous subdural drain placement    Plan:      -Discussed with Dr. Reid  -Neuro Checks Q4  -  staples open to air   -Stat CTH for any change in neuro exam   -Keppra 500 BID x 7 days till 6/8  --160, PRN hydralazine  - Hold AV nodals 2/2 pauses on rhythm strip   - Outpatient MCOT placement   -RA  -Regular diet  -Last BM 6/3, continue miralax   -Voiding  -HX BPH: continue Flomax 0.4 qHS  -Pain control: Tylenol, Tramadol 25/50 PRN   -DVT ppx: SCD's, SQL   -PT/OT: recommending AR, pending auth

## 2025-06-04 NOTE — PROGRESS NOTE ADULT - SUBJECTIVE AND OBJECTIVE BOX
CC: Patient being seen for rehabilitation follow up.  Patient reports he had worsened headaches overnight and is fatigued from poor sleep.     FUNCTIONAL PROGRESS  6/3 PT  Sit-Stand Transfer Training  Transfer Training Sit-to-Stand Transfer: moderate assist (50% patient effort);  1 person assist;  weight-bearing as tolerated   rolling walker  Transfer Training Stand-to-Sit Transfer: minimum assist (75% patient effort);  1 person assist;  weight-bearing as tolerated   rolling walker  Sit-to-Stand Transfer Training Transfer Safety Analysis: decreased balance;  decreased weight-shifting ability;  decreased strength;  impaired balance;  Lifting assist needed from elevated surface ;  rolling walker    Gait Training  Gait Training: minimum assist (75% patient effort);  1 person assist;  weight-bearing as tolerated   rolling walker;  25 feet  Gait Analysis: 2-point gait   decreased nguyễn;  impaired balance;  decreased strength;  impaired postural control;  flexed posture, (+) right knee genurecurvatum in stance phase, + right foot drop with compensatory steppage gait however, balance remains impaired. ;  25 feet;  distance limited 2*2 pt reports need to use toilet for BM;  rolling walker      VITALS  T(C): 36.4 (06-04-25 @ 07:30), Max: 36.7 (06-03-25 @ 13:10)  HR: 76 (06-04-25 @ 08:00) (52 - 76)  BP: 129/69 (06-04-25 @ 08:00) (125/82 - 150/71)  RR: 15 (06-04-25 @ 08:00) (11 - 20)  SpO2: 96% (06-04-25 @ 08:00) (95% - 100%)  Wt(kg): --    MEDICATIONS   acetaminophen     Tablet .. 650 milliGRAM(s) every 6 hours PRN  enoxaparin Injectable 40 milliGRAM(s) every 24 hours  hydrALAZINE Injectable 10 milliGRAM(s) every 4 hours PRN  levETIRAcetam 500 milliGRAM(s) two times a day  montelukast 10 milliGRAM(s) daily  multivitamin 1 Tablet(s) daily  polyethylene glycol 3350 17 Gram(s) daily  rosuvastatin 10 milliGRAM(s) at bedtime  tamsulosin 0.4 milliGRAM(s) at bedtime  traMADol 25 milliGRAM(s) every 6 hours PRN      RECENT LABS/IMAGING  - Reviewed Today                        11.7   3.95  )-----------( 168      ( 03 Jun 2025 01:30 )             36.5     06-03    140  |  103  |  17.6  ----------------------------<  117[H]  4.4   |  27.0  |  0.83    Ca    8.6      03 Jun 2025 01:30  Phos  3.3     06-03  Mg     1.8     06-03        Urinalysis Basic - ( 03 Jun 2025 01:30 )    Color: x / Appearance: x / SG: x / pH: x  Gluc: 117 mg/dL / Ketone: x  / Bili: x / Urobili: x   Blood: x / Protein: x / Nitrite: x   Leuk Esterase: x / RBC: x / WBC x   Sq Epi: x / Non Sq Epi: x / Bacteria: x            HEAD CT 5/30 -  Moderate to large acute on subacute subdural hematoma right cerebral convexity with associated mass effect..    CERVICAL SPINE CT  5/30 - No evidence of an acute cervical spine fracture.    HEAD CT 6/1 - Right frontal shunt catheter has been removed, the rest of this study appears stable.    TTE 6/3 -  1. Left ventricular cavity is normal in size. Left ventricular systolic function is normal with an ejection fraction visually estimated at 55 to 60 %.   2. There is moderate (grade 2) left ventricular diastolic dysfunction, with elevated left ventricular filling pressure.   3. Normal right ventricular cavity size, with normal wall thickness, and probably normal right ventricular systolic function.   4. Normal left and right atrial size.   5. No significant valvular disease.   6. No pericardial effusion seen.   7. Aortic root at the sinuses of Valsalva is dilated, measuring 4.03 cm (indexed 1.94 cm/m²).   8. Estimated pulmonary artery systolic pressure is 38 mmHg.   9. Mild mitral valve leaflet calcification.    ----------------------------------------------------------------------------------------  PHYSICAL EXAM  Constitutional - NAD, Appears Comfortable  HEENT - Right Darlin - CDI  Extremities - No C/C/E, No calf tenderness   Neurologic Exam -                    Cognitive - AAO to self, place, date, year, situation     Communication - Fluent, No dysarthria     Cranial Nerves - CN 2-12 intact     FUNCTIONAL MOTOR EXAM -                      LEFT    UE - ShAB *1/5, EF 5/5, EE 5/5, WE 5/5,  5/5 *due to shoulder tears premorbid                    RIGHT UE - ShAB *2/5, EF 5/5, EE 5/5, WE 5/5,  5/5                    LEFT    LE - HF -/5, KE -/5, DF 5/5, PF 5/5                    RIGHT LE - HF -/5, KE -/5, DF 5/5, PF 5/5        Sensory - Intact to LT (some neuropathy - preexisting)  Psychiatric - Calm, Perseverative  ----------------------------------------------------------------------------------------  ASSESSMENT/PLAN  79yMale with functional deficits after a fall sustaining a SDH  Traumatic right SDH s/p MMA embo/drain - Consider CHANGE Keppra to DEPAKOTE (helps with post-traumatic headaches)  HTN - Hydralazine/Labetalol IV  Post-traumatic headaches/Neuropathy - RECOMMEND Neurontin 100mg BID and 300mg HS   CAD - Crestor  Pain - Tylenol, CONSIDER WARM COMPRESSES to face, TRAMADOLDVT PPX - SCDs, Lovenox  Rehab/Impaired mobility and function - Patient continues to require hospitalization for the above diagnoses and ongoing active management of comorbid complications that are substantially posing a threat to bodily function, functional ability and quality of life.     RECOMMEND - OOB daily     When medically optimized, based on the patient's diagnosis, current functional status and potential for progress, recommend ACUTE inpatient rehabilitation for the functional deficits consisting of 3 hours of therapy/day & 24 hour RN/daily PMR physician for active comorbid medical management. Patient will be able to tolerate 3 hours a day.    Will need PT and OT within 48hrs AR admission.

## 2025-06-04 NOTE — PROGRESS NOTE ADULT - SUBJECTIVE AND OBJECTIVE BOX
[facility]                                                         CARDIOLOGY AT [address], [place] [address]                                                         Telephone: [phone]. Fax:[phone]                                                                             PROGRESS NOTE    Reason for follow up: sign off     Review of symptoms:   Cardiac:  No chest pain. No dyspnea. No palpitations.  Respiratory: no cough. No dyspnea  Gastrointestinal: No diarrhea. No abdominal pain. No bleeding.   Neuro: No focal neuro complaints.  All other ROS negative unless otherwise listed above    PHYSICAL EXAM:  Appearance: Comfortable. No acute distress  HEENT:  Atraumatic. Normocephalic.  Normal oral mucosa  Neurologic: A & O x 3, no gross focal deficits.  Cardiovascular: RRR S1 S2, No murmur, no rubs/gallops. No JVD  Respiratory: Lungs clear to auscultation, unlabored   Gastrointestinal:  Soft, Non-tender, + BS  Lower Extremities: 2+ Peripheral Pulses, No clubbing, cyanosis, or edema  Psychiatry: Patient is calm. No agitation.   Skin: warm and dry.    Vitals:  T(C): 36.4 (06-04-25 @ 07:30), Max: 36.7 (06-03-25 @ 13:10)  HR: 76 (06-04-25 @ 08:00) (52 - 76)  BP: 129/69 (06-04-25 @ 08:00) (125/82 - 150/71)  RR: 15 (06-04-25 @ 08:00) (11 - 20)  SpO2: 96% (06-04-25 @ 08:00) (95% - 100%)  Wt(kg): --  I&O's Summary    Weight (kg): 85.3 (05-30 @ 10:10)    CURRENT CARDIAC MEDICATIONS:  hydrALAZINE Injectable 10 milliGRAM(s) IV Push every 4 hours PRN      CURRENT OTHER MEDICATIONS:  montelukast 10 milliGRAM(s) Oral daily  acetaminophen     Tablet .. 650 milliGRAM(s) Oral every 6 hours PRN Temp greater or equal to 38C (100.4F), Mild Pain (1 - 3)  levETIRAcetam 500 milliGRAM(s) Oral two times a day  traMADol 25 milliGRAM(s) Oral every 6 hours PRN Moderate Pain (4 - 6)  polyethylene glycol 3350 17 Gram(s) Oral daily  rosuvastatin 10 milliGRAM(s) Oral at bedtime  enoxaparin Injectable 40 milliGRAM(s) SubCutaneous every 24 hours  multivitamin 1 Tablet(s) Oral daily  tamsulosin 0.4 milliGRAM(s) Oral at bedtime      LABS:	 	               11.7   3.95  )-----------( 168      ( 03 Jun 2025 01:30 )             36.5     06-03    140  |  103  |  17.6  ----------------------------<  117[H]  4.4   |  27.0  |  0.83    Ca    8.6      03 Jun 2025 01:30  Phos  3.3     06-03  Mg     1.8     06-03      PT/INR/PTT ( 30 May 2025 18:01 )                       :                       :      11.8         :       32.9                  .        .                   .              .           .       1.04        .                                       Lipid Profile:   HgA1c:   TSH:

## 2025-06-04 NOTE — PROGRESS NOTE ADULT - PROVIDER SPECIALTY LIST ADULT
Neurosurgery
Neurosurgery
Physiatry
NSICU
Neurosurgery
Physiatry
Neurosurgery
Neurosurgery
Cardiology
NSICU

## 2025-06-04 NOTE — PROGRESS NOTE ADULT - PROBLEM SELECTOR PLAN 1
- pt with mechanical fall w/ subsequent SDH w/ mass effect s/p MMA embolization   - cardiology initially consulted for concern for arrthmia  - telemetry reviewed pt has SB/SR w/ PACs with and without conversion pauses.   - he is not interested in any invasive procedures or interventions at this time from the cardiovascular perspective understanding risks/benefits/alternatives and rationale.  - plan for outpatient [facility] upon discharge   - EF 55-60% w/ grade II DD, no significant valvulopathy   - no further workup, we will sign off.   - follow up outpatient with Dr. Hernandez within 2 weeks from discharge - pt with mechanical fall w/ subsequent SDH w/ mass effect s/p MMA embolization   - cardiology initially consulted for concern for arrthmia  - telemetry reviewed pt has SB/SR w/ PACs with and without conversion pauses.   - he is not interested in any invasive procedures or interventions at this time from the cardiovascular perspective understanding risks/benefits/alternatives and rationale.  - plan for outpatient [facility] upon discharge   - EF 55-60% w/ grade II DD, no significant valvulopathy   - no further workup, we will sign off.

## 2025-06-04 NOTE — PROGRESS NOTE ADULT - ASSESSMENT
79yM PMH HLD, HTN, prostate CA, seasonal allergies, BPH, cardiac catheterization many years ago s/p 2 stents at [facility],  originally on Plavix but then stopped 2/2 ulcerative colitis and never went on ASA, h/o prostate CA, lung CA, melanoma s/p resection, remote history of afib after pulmonary testing several years ago originally on Eliquis but it flared up his ulcerative colitis so came off of it and hasn't heard about arrhythmia since then, squamous cell CA s/p radiation, h/o ACDF 1996, h/o anterior approach lumbar fusion in [place], [place] 1 year ago 5/28/24, now presents to ED s/p fall Saturday 5/24 without LOC, since fall has had difficulty walking and headaches unrelieved (at worst 5/10, currently 3/10) despite taking Tylenol/Advil.    Patient admitted for HEAD CT:  Moderate to large acute on subacute subdural hematoma right cerebral convexity with associated mass effect.  Patient underwent MMA embolization.  A consult was placed due to an arrythmia noted on the monitor with a concern for Afib.   Patient examined bedside in Neuro ICU and currently dizziness, headache chest pain, palpitations sob, pnd, orthopnea, hemoptysis, n/v/d/abd pain, cramps, urianry discomfort, fever, chills, or any other discomfort.

## 2025-06-11 ENCOUNTER — APPOINTMENT (OUTPATIENT)
Dept: CT IMAGING | Facility: CLINIC | Age: 80
End: 2025-06-11

## 2025-06-18 ENCOUNTER — NON-APPOINTMENT (OUTPATIENT)
Age: 80
End: 2025-06-18

## 2025-06-19 ENCOUNTER — APPOINTMENT (OUTPATIENT)
Dept: NEUROSURGERY | Facility: CLINIC | Age: 80
End: 2025-06-19
Payer: MEDICARE

## 2025-06-19 VITALS
HEART RATE: 68 BPM | SYSTOLIC BLOOD PRESSURE: 119 MMHG | WEIGHT: 188 LBS | OXYGEN SATURATION: 97 % | TEMPERATURE: 208.76 F | DIASTOLIC BLOOD PRESSURE: 73 MMHG | HEIGHT: 72 IN | BODY MASS INDEX: 25.47 KG/M2

## 2025-06-19 PROCEDURE — 99214 OFFICE O/P EST MOD 30 MIN: CPT | Mod: 24

## 2025-06-19 RX ORDER — ATORVASTATIN CALCIUM 40 MG/1
40 TABLET, FILM COATED ORAL
Refills: 0 | Status: ACTIVE | COMMUNITY

## 2025-06-19 RX ORDER — RISANKIZUMAB-RZAA 150 MG/ML
INJECTION SUBCUTANEOUS
Refills: 0 | Status: ACTIVE | COMMUNITY

## 2025-06-19 RX ORDER — MULTIVITAMIN WITH FOLIC ACID 400 MCG
TABLET ORAL
Refills: 0 | Status: ACTIVE | COMMUNITY

## 2025-07-26 ENCOUNTER — OUTPATIENT (OUTPATIENT)
Dept: OUTPATIENT SERVICES | Facility: HOSPITAL | Age: 80
LOS: 1 days | End: 2025-07-26

## 2025-07-26 ENCOUNTER — APPOINTMENT (OUTPATIENT)
Dept: CT IMAGING | Facility: CLINIC | Age: 80
End: 2025-07-26
Payer: MEDICARE

## 2025-07-26 DIAGNOSIS — Z98.890 OTHER SPECIFIED POSTPROCEDURAL STATES: Chronic | ICD-10-CM

## 2025-07-26 DIAGNOSIS — M43.22 FUSION OF SPINE, CERVICAL REGION: Chronic | ICD-10-CM

## 2025-07-26 DIAGNOSIS — S06.5XAA TRAUMATIC SUBDURAL HEMORRHAGE WITH LOSS OF CONSCIOUSNESS STATUS UNKNOWN, INITIAL ENCOUNTER: ICD-10-CM

## 2025-07-26 PROCEDURE — 70450 CT HEAD/BRAIN W/O DYE: CPT | Mod: 26

## 2025-07-31 ENCOUNTER — APPOINTMENT (OUTPATIENT)
Dept: NEUROSURGERY | Facility: CLINIC | Age: 80
End: 2025-07-31
Payer: MEDICARE

## 2025-07-31 VITALS
HEIGHT: 72 IN | SYSTOLIC BLOOD PRESSURE: 127 MMHG | HEART RATE: 77 BPM | WEIGHT: 185 LBS | OXYGEN SATURATION: 98 % | DIASTOLIC BLOOD PRESSURE: 65 MMHG | TEMPERATURE: 97.9 F | BODY MASS INDEX: 25.06 KG/M2

## 2025-07-31 DIAGNOSIS — S06.5XAA TRAUMATIC SUBDURAL HEMORRHAGE WITH LOSS OF CONSCIOUSNESS STATUS UNKNOWN, INITIAL ENCOUNTER: ICD-10-CM

## 2025-07-31 PROCEDURE — 99213 OFFICE O/P EST LOW 20 MIN: CPT | Mod: 24

## 2025-08-12 ENCOUNTER — INPATIENT (INPATIENT)
Facility: HOSPITAL | Age: 80
LOS: 0 days | Discharge: HOME | DRG: 86 | End: 2025-08-13
Attending: STUDENT IN AN ORGANIZED HEALTH CARE EDUCATION/TRAINING PROGRAM | Admitting: STUDENT IN AN ORGANIZED HEALTH CARE EDUCATION/TRAINING PROGRAM
Payer: MEDICARE

## 2025-08-12 VITALS
WEIGHT: 191.8 LBS | OXYGEN SATURATION: 96 % | HEART RATE: 97 BPM | RESPIRATION RATE: 20 BRPM | TEMPERATURE: 98 F | SYSTOLIC BLOOD PRESSURE: 136 MMHG | DIASTOLIC BLOOD PRESSURE: 68 MMHG

## 2025-08-12 DIAGNOSIS — Z98.890 OTHER SPECIFIED POSTPROCEDURAL STATES: Chronic | ICD-10-CM

## 2025-08-12 DIAGNOSIS — M43.22 FUSION OF SPINE, CERVICAL REGION: Chronic | ICD-10-CM

## 2025-08-12 PROCEDURE — 99285 EMERGENCY DEPT VISIT HI MDM: CPT

## 2025-08-12 PROCEDURE — 72125 CT NECK SPINE W/O DYE: CPT | Mod: 26

## 2025-08-12 PROCEDURE — 70450 CT HEAD/BRAIN W/O DYE: CPT | Mod: 26

## 2025-08-12 RX ORDER — ACETAMINOPHEN 500 MG/5ML
650 LIQUID (ML) ORAL ONCE
Refills: 0 | Status: COMPLETED | OUTPATIENT
Start: 2025-08-12 | End: 2025-08-12

## 2025-08-12 RX ORDER — ONDANSETRON HCL/PF 4 MG/2 ML
4 VIAL (ML) INJECTION ONCE
Refills: 0 | Status: COMPLETED | OUTPATIENT
Start: 2025-08-12 | End: 2025-08-12

## 2025-08-12 RX ADMIN — Medication 4 MILLIGRAM(S): at 22:40

## 2025-08-12 RX ADMIN — Medication 650 MILLIGRAM(S): at 23:52

## 2025-08-12 RX ADMIN — Medication 650 MILLIGRAM(S): at 22:40

## 2025-08-13 ENCOUNTER — TRANSCRIPTION ENCOUNTER (OUTPATIENT)
Age: 80
End: 2025-08-13

## 2025-08-13 VITALS
HEART RATE: 75 BPM | RESPIRATION RATE: 22 BRPM | OXYGEN SATURATION: 97 % | DIASTOLIC BLOOD PRESSURE: 107 MMHG | SYSTOLIC BLOOD PRESSURE: 123 MMHG

## 2025-08-13 DIAGNOSIS — I62.01 NONTRAUMATIC ACUTE SUBDURAL HEMORRHAGE: ICD-10-CM

## 2025-08-13 LAB
ALBUMIN SERPL ELPH-MCNC: 3.6 G/DL — SIGNIFICANT CHANGE UP (ref 3.3–5.2)
ALBUMIN SERPL ELPH-MCNC: 4 G/DL — SIGNIFICANT CHANGE UP (ref 3.3–5.2)
ALP SERPL-CCNC: 123 U/L — HIGH (ref 40–120)
ALP SERPL-CCNC: 96 U/L — SIGNIFICANT CHANGE UP (ref 40–120)
ALT FLD-CCNC: 16 U/L — SIGNIFICANT CHANGE UP
ALT FLD-CCNC: 20 U/L — SIGNIFICANT CHANGE UP
ANION GAP SERPL CALC-SCNC: 10 MMOL/L — SIGNIFICANT CHANGE UP (ref 5–17)
ANION GAP SERPL CALC-SCNC: 12 MMOL/L — SIGNIFICANT CHANGE UP (ref 5–17)
APTT BLD: 32.6 SEC — SIGNIFICANT CHANGE UP (ref 26.1–36.8)
AST SERPL-CCNC: 19 U/L — SIGNIFICANT CHANGE UP
AST SERPL-CCNC: 27 U/L — SIGNIFICANT CHANGE UP
BASOPHILS # BLD AUTO: 0.03 K/UL — SIGNIFICANT CHANGE UP (ref 0–0.2)
BASOPHILS NFR BLD AUTO: 0.6 % — SIGNIFICANT CHANGE UP (ref 0–2)
BILIRUB DIRECT SERPL-MCNC: 0.1 MG/DL — SIGNIFICANT CHANGE UP (ref 0–0.3)
BILIRUB INDIRECT FLD-MCNC: 0.2 MG/DL — SIGNIFICANT CHANGE UP (ref 0.2–1)
BILIRUB SERPL-MCNC: 0.3 MG/DL — LOW (ref 0.4–2)
BILIRUB SERPL-MCNC: 0.3 MG/DL — LOW (ref 0.4–2)
BLD GP AB SCN SERPL QL: SIGNIFICANT CHANGE UP
BUN SERPL-MCNC: 22.3 MG/DL — HIGH (ref 8–20)
BUN SERPL-MCNC: 24.4 MG/DL — HIGH (ref 8–20)
CALCIUM SERPL-MCNC: 8.7 MG/DL — SIGNIFICANT CHANGE UP (ref 8.4–10.5)
CALCIUM SERPL-MCNC: 9.1 MG/DL — SIGNIFICANT CHANGE UP (ref 8.4–10.5)
CHLORIDE SERPL-SCNC: 105 MMOL/L — SIGNIFICANT CHANGE UP (ref 96–108)
CHLORIDE SERPL-SCNC: 107 MMOL/L — SIGNIFICANT CHANGE UP (ref 96–108)
CO2 SERPL-SCNC: 23 MMOL/L — SIGNIFICANT CHANGE UP (ref 22–29)
CO2 SERPL-SCNC: 25 MMOL/L — SIGNIFICANT CHANGE UP (ref 22–29)
CREAT SERPL-MCNC: 1.04 MG/DL — SIGNIFICANT CHANGE UP (ref 0.5–1.3)
CREAT SERPL-MCNC: 1.04 MG/DL — SIGNIFICANT CHANGE UP (ref 0.5–1.3)
EGFR: 74 ML/MIN/1.73M2 — SIGNIFICANT CHANGE UP
EOSINOPHIL # BLD AUTO: 0.07 K/UL — SIGNIFICANT CHANGE UP (ref 0–0.5)
EOSINOPHIL NFR BLD AUTO: 1.5 % — SIGNIFICANT CHANGE UP (ref 0–6)
GLUCOSE SERPL-MCNC: 144 MG/DL — HIGH (ref 70–99)
GLUCOSE SERPL-MCNC: 91 MG/DL — SIGNIFICANT CHANGE UP (ref 70–99)
HCT VFR BLD CALC: 33.3 % — LOW (ref 39–50)
HCT VFR BLD CALC: 36.7 % — LOW (ref 39–50)
HGB BLD-MCNC: 10.5 G/DL — LOW (ref 13–17)
HGB BLD-MCNC: 11.8 G/DL — LOW (ref 13–17)
IMM GRANULOCYTES # BLD AUTO: 0.02 K/UL — SIGNIFICANT CHANGE UP (ref 0–0.07)
IMM GRANULOCYTES NFR BLD AUTO: 0.4 % — SIGNIFICANT CHANGE UP (ref 0–0.9)
INR BLD: 1.07 RATIO — SIGNIFICANT CHANGE UP (ref 0.85–1.16)
LYMPHOCYTES # BLD AUTO: 0.83 K/UL — LOW (ref 1–3.3)
LYMPHOCYTES NFR BLD AUTO: 17.8 % — SIGNIFICANT CHANGE UP (ref 13–44)
MAGNESIUM SERPL-MCNC: 2.3 MG/DL — SIGNIFICANT CHANGE UP (ref 1.6–2.6)
MCHC RBC-ENTMCNC: 29.7 PG — SIGNIFICANT CHANGE UP (ref 27–34)
MCHC RBC-ENTMCNC: 30 PG — SIGNIFICANT CHANGE UP (ref 27–34)
MCHC RBC-ENTMCNC: 31.5 G/DL — LOW (ref 32–36)
MCHC RBC-ENTMCNC: 32.2 G/DL — SIGNIFICANT CHANGE UP (ref 32–36)
MCV RBC AUTO: 93.4 FL — SIGNIFICANT CHANGE UP (ref 80–100)
MCV RBC AUTO: 94.3 FL — SIGNIFICANT CHANGE UP (ref 80–100)
MONOCYTES # BLD AUTO: 0.49 K/UL — SIGNIFICANT CHANGE UP (ref 0–0.9)
MONOCYTES NFR BLD AUTO: 10.5 % — SIGNIFICANT CHANGE UP (ref 2–14)
MRSA PCR RESULT.: SIGNIFICANT CHANGE UP
NEUTROPHILS # BLD AUTO: 3.23 K/UL — SIGNIFICANT CHANGE UP (ref 1.8–7.4)
NEUTROPHILS NFR BLD AUTO: 69.2 % — SIGNIFICANT CHANGE UP (ref 43–77)
NRBC # BLD AUTO: 0 K/UL — SIGNIFICANT CHANGE UP (ref 0–0)
NRBC # BLD AUTO: 0 K/UL — SIGNIFICANT CHANGE UP (ref 0–0)
NRBC # FLD: 0 K/UL — SIGNIFICANT CHANGE UP (ref 0–0)
NRBC # FLD: 0 K/UL — SIGNIFICANT CHANGE UP (ref 0–0)
NRBC BLD AUTO-RTO: 0 /100 WBCS — SIGNIFICANT CHANGE UP (ref 0–0)
NRBC BLD AUTO-RTO: 0 /100 WBCS — SIGNIFICANT CHANGE UP (ref 0–0)
PHOSPHATE SERPL-MCNC: 3.5 MG/DL — SIGNIFICANT CHANGE UP (ref 2.4–4.7)
PLATELET # BLD AUTO: 164 K/UL — SIGNIFICANT CHANGE UP (ref 150–400)
PLATELET # BLD AUTO: 195 K/UL — SIGNIFICANT CHANGE UP (ref 150–400)
PMV BLD: 9.7 FL — SIGNIFICANT CHANGE UP (ref 7–13)
PMV BLD: 9.9 FL — SIGNIFICANT CHANGE UP (ref 7–13)
POTASSIUM SERPL-MCNC: 4.1 MMOL/L — SIGNIFICANT CHANGE UP (ref 3.5–5.3)
POTASSIUM SERPL-MCNC: 4.6 MMOL/L — SIGNIFICANT CHANGE UP (ref 3.5–5.3)
POTASSIUM SERPL-SCNC: 4.1 MMOL/L — SIGNIFICANT CHANGE UP (ref 3.5–5.3)
POTASSIUM SERPL-SCNC: 4.6 MMOL/L — SIGNIFICANT CHANGE UP (ref 3.5–5.3)
PROT SERPL-MCNC: 6.1 G/DL — LOW (ref 6.6–8.7)
PROT SERPL-MCNC: 7 G/DL — SIGNIFICANT CHANGE UP (ref 6.6–8.7)
PROTHROM AB SERPL-ACNC: 12.4 SEC — SIGNIFICANT CHANGE UP (ref 9.9–13.4)
RBC # BLD: 3.53 M/UL — LOW (ref 4.2–5.8)
RBC # BLD: 3.93 M/UL — LOW (ref 4.2–5.8)
RBC # FLD: 14 % — SIGNIFICANT CHANGE UP (ref 10.3–14.5)
RBC # FLD: 14.2 % — SIGNIFICANT CHANGE UP (ref 10.3–14.5)
S AUREUS DNA NOSE QL NAA+PROBE: SIGNIFICANT CHANGE UP
SODIUM SERPL-SCNC: 140 MMOL/L — SIGNIFICANT CHANGE UP (ref 135–145)
SODIUM SERPL-SCNC: 142 MMOL/L — SIGNIFICANT CHANGE UP (ref 135–145)
WBC # BLD: 2.92 K/UL — LOW (ref 3.8–10.5)
WBC # BLD: 4.67 K/UL — SIGNIFICANT CHANGE UP (ref 3.8–10.5)
WBC # FLD AUTO: 2.92 K/UL — LOW (ref 3.8–10.5)
WBC # FLD AUTO: 4.67 K/UL — SIGNIFICANT CHANGE UP (ref 3.8–10.5)

## 2025-08-13 PROCEDURE — 99223 1ST HOSP IP/OBS HIGH 75: CPT

## 2025-08-13 PROCEDURE — 85610 PROTHROMBIN TIME: CPT

## 2025-08-13 PROCEDURE — 80053 COMPREHEN METABOLIC PANEL: CPT

## 2025-08-13 PROCEDURE — 99283 EMERGENCY DEPT VISIT LOW MDM: CPT

## 2025-08-13 PROCEDURE — 72170 X-RAY EXAM OF PELVIS: CPT

## 2025-08-13 PROCEDURE — 86901 BLOOD TYPING SEROLOGIC RH(D): CPT

## 2025-08-13 PROCEDURE — 73080 X-RAY EXAM OF ELBOW: CPT | Mod: 26,RT

## 2025-08-13 PROCEDURE — 85730 THROMBOPLASTIN TIME PARTIAL: CPT

## 2025-08-13 PROCEDURE — 86850 RBC ANTIBODY SCREEN: CPT

## 2025-08-13 PROCEDURE — 73080 X-RAY EXAM OF ELBOW: CPT

## 2025-08-13 PROCEDURE — 86900 BLOOD TYPING SEROLOGIC ABO: CPT

## 2025-08-13 PROCEDURE — 93005 ELECTROCARDIOGRAM TRACING: CPT

## 2025-08-13 PROCEDURE — 99231 SBSQ HOSP IP/OBS SF/LOW 25: CPT

## 2025-08-13 PROCEDURE — 70450 CT HEAD/BRAIN W/O DYE: CPT | Mod: 26

## 2025-08-13 PROCEDURE — 85027 COMPLETE CBC AUTOMATED: CPT

## 2025-08-13 PROCEDURE — 99285 EMERGENCY DEPT VISIT HI MDM: CPT | Mod: 25

## 2025-08-13 PROCEDURE — 84100 ASSAY OF PHOSPHORUS: CPT

## 2025-08-13 PROCEDURE — 97167 OT EVAL HIGH COMPLEX 60 MIN: CPT

## 2025-08-13 PROCEDURE — 87640 STAPH A DNA AMP PROBE: CPT

## 2025-08-13 PROCEDURE — 80076 HEPATIC FUNCTION PANEL: CPT

## 2025-08-13 PROCEDURE — 93010 ELECTROCARDIOGRAM REPORT: CPT

## 2025-08-13 PROCEDURE — 72125 CT NECK SPINE W/O DYE: CPT

## 2025-08-13 PROCEDURE — 87641 MR-STAPH DNA AMP PROBE: CPT

## 2025-08-13 PROCEDURE — 71045 X-RAY EXAM CHEST 1 VIEW: CPT | Mod: 26

## 2025-08-13 PROCEDURE — 80048 BASIC METABOLIC PNL TOTAL CA: CPT

## 2025-08-13 PROCEDURE — 70450 CT HEAD/BRAIN W/O DYE: CPT

## 2025-08-13 PROCEDURE — 36415 COLL VENOUS BLD VENIPUNCTURE: CPT

## 2025-08-13 PROCEDURE — 71045 X-RAY EXAM CHEST 1 VIEW: CPT

## 2025-08-13 PROCEDURE — 83735 ASSAY OF MAGNESIUM: CPT

## 2025-08-13 PROCEDURE — 85025 COMPLETE CBC W/AUTO DIFF WBC: CPT

## 2025-08-13 PROCEDURE — 72170 X-RAY EXAM OF PELVIS: CPT | Mod: 26

## 2025-08-13 RX ORDER — POLYETHYLENE GLYCOL 3350 17 G/17G
17 POWDER, FOR SOLUTION ORAL DAILY
Refills: 0 | Status: DISCONTINUED | OUTPATIENT
Start: 2025-08-13 | End: 2025-08-13

## 2025-08-13 RX ORDER — SENNA 187 MG
2 TABLET ORAL AT BEDTIME
Refills: 0 | Status: DISCONTINUED | OUTPATIENT
Start: 2025-08-13 | End: 2025-08-13

## 2025-08-13 RX ORDER — ROSUVASTATIN CALCIUM 20 MG/1
10 TABLET, FILM COATED ORAL AT BEDTIME
Refills: 0 | Status: DISCONTINUED | OUTPATIENT
Start: 2025-08-13 | End: 2025-08-13

## 2025-08-13 RX ORDER — MONTELUKAST SODIUM 10 MG/1
10 TABLET ORAL DAILY
Refills: 0 | Status: DISCONTINUED | OUTPATIENT
Start: 2025-08-13 | End: 2025-08-13

## 2025-08-13 RX ORDER — ACETAMINOPHEN 500 MG/5ML
650 LIQUID (ML) ORAL EVERY 6 HOURS
Refills: 0 | Status: DISCONTINUED | OUTPATIENT
Start: 2025-08-13 | End: 2025-08-13

## 2025-08-13 RX ORDER — TAMSULOSIN HYDROCHLORIDE 0.4 MG/1
0.4 CAPSULE ORAL AT BEDTIME
Refills: 0 | Status: DISCONTINUED | OUTPATIENT
Start: 2025-08-13 | End: 2025-08-13

## 2025-08-13 RX ADMIN — Medication 84 MILLILITER(S): at 01:18

## 2025-08-13 RX ADMIN — Medication 650 MILLIGRAM(S): at 11:31

## 2025-08-13 RX ADMIN — Medication 84 MILLILITER(S): at 02:44

## 2025-08-13 RX ADMIN — Medication 1 APPLICATION(S): at 05:57

## 2025-08-13 RX ADMIN — MONTELUKAST SODIUM 10 MILLIGRAM(S): 10 TABLET ORAL at 11:32

## 2025-08-13 RX ADMIN — Medication 650 MILLIGRAM(S): at 05:57

## 2025-09-09 ENCOUNTER — APPOINTMENT (OUTPATIENT)
Dept: CT IMAGING | Facility: CLINIC | Age: 80
End: 2025-09-09

## 2025-09-11 ENCOUNTER — APPOINTMENT (OUTPATIENT)
Dept: CT IMAGING | Facility: CLINIC | Age: 80
End: 2025-09-11
Payer: MEDICARE

## 2025-09-11 ENCOUNTER — APPOINTMENT (OUTPATIENT)
Dept: NEUROSURGERY | Facility: CLINIC | Age: 80
End: 2025-09-11
Payer: MEDICARE

## 2025-09-11 VITALS
HEIGHT: 72 IN | TEMPERATURE: 97.9 F | SYSTOLIC BLOOD PRESSURE: 117 MMHG | DIASTOLIC BLOOD PRESSURE: 69 MMHG | HEART RATE: 82 BPM | OXYGEN SATURATION: 97 % | WEIGHT: 175 LBS | BODY MASS INDEX: 23.7 KG/M2

## 2025-09-11 DIAGNOSIS — Z85.118 PERSONAL HISTORY OF OTHER MALIGNANT NEOPLASM OF BRONCHUS AND LUNG: ICD-10-CM

## 2025-09-11 DIAGNOSIS — S06.5XAA TRAUMATIC SUBDURAL HEMORRHAGE WITH LOSS OF CONSCIOUSNESS STATUS UNKNOWN, INITIAL ENCOUNTER: ICD-10-CM

## 2025-09-11 DIAGNOSIS — Z86.39 PERSONAL HISTORY OF OTHER ENDOCRINE, NUTRITIONAL AND METABOLIC DISEASE: ICD-10-CM

## 2025-09-11 PROCEDURE — 70450 CT HEAD/BRAIN W/O DYE: CPT | Mod: 26

## 2025-09-11 PROCEDURE — 99214 OFFICE O/P EST MOD 30 MIN: CPT

## (undated) DEVICE — VENODYNE/SCD SLEEVE CALF MEDIUM

## (undated) DEVICE — DRAPE C ARM BAND BAG

## (undated) DEVICE — DRAPE BACK TABLE COVER 44X90"

## (undated) DEVICE — BASIN SET DOUBLE

## (undated) DEVICE — GLV 7.5 PROTEXIS (WHITE)

## (undated) DEVICE — SYR LUER LOK 50CC

## (undated) DEVICE — SOL IRR BAG H2O 3000ML

## (undated) DEVICE — GRID BRACHYTHERAPY EZ 18G

## (undated) DEVICE — GOWN XL

## (undated) DEVICE — STOPCOCK 3 WAY

## (undated) DEVICE — FOLEY CATH 2-WAY 16FR 5CC LATEX LUBRICATH

## (undated) DEVICE — SYR LUER LOK 10CC

## (undated) DEVICE — FOLEY CATH 2-WAY 10FR 3CC SILICONE

## (undated) DEVICE — Device

## (undated) DEVICE — WARMING BLANKET UPPER ADULT

## (undated) DEVICE — PREP BETADINE SPONGE STICKS

## (undated) DEVICE — BALLOON ENDOCAVITY 2X14CM